# Patient Record
Sex: FEMALE | Race: OTHER | NOT HISPANIC OR LATINO | URBAN - METROPOLITAN AREA
[De-identification: names, ages, dates, MRNs, and addresses within clinical notes are randomized per-mention and may not be internally consistent; named-entity substitution may affect disease eponyms.]

---

## 2017-01-01 ENCOUNTER — OUTPATIENT (OUTPATIENT)
Dept: OUTPATIENT SERVICES | Facility: HOSPITAL | Age: 77
LOS: 1 days | Discharge: HOME | End: 2017-01-01

## 2017-01-01 DIAGNOSIS — R79.9 ABNORMAL FINDING OF BLOOD CHEMISTRY, UNSPECIFIED: ICD-10-CM

## 2017-01-01 DIAGNOSIS — D64.9 ANEMIA, UNSPECIFIED: ICD-10-CM

## 2018-01-01 ENCOUNTER — INPATIENT (INPATIENT)
Facility: HOSPITAL | Age: 78
LOS: 5 days | End: 2018-04-13
Attending: HOSPITALIST

## 2018-01-01 ENCOUNTER — OUTPATIENT (OUTPATIENT)
Dept: OUTPATIENT SERVICES | Facility: HOSPITAL | Age: 78
LOS: 1 days | Discharge: HOME | End: 2018-01-01

## 2018-01-01 VITALS
HEART RATE: 100 BPM | RESPIRATION RATE: 28 BRPM | SYSTOLIC BLOOD PRESSURE: 129 MMHG | DIASTOLIC BLOOD PRESSURE: 71 MMHG | OXYGEN SATURATION: 97 %

## 2018-01-01 VITALS — WEIGHT: 143.08 LBS | HEIGHT: 62 IN
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
## 2018-01-01 DIAGNOSIS — L03.90 CELLULITIS, UNSPECIFIED: ICD-10-CM

## 2018-01-01 DIAGNOSIS — E78.5 HYPERLIPIDEMIA, UNSPECIFIED: ICD-10-CM

## 2018-01-01 DIAGNOSIS — J18.9 PNEUMONIA, UNSPECIFIED ORGANISM: ICD-10-CM

## 2018-01-01 DIAGNOSIS — Z51.5 ENCOUNTER FOR PALLIATIVE CARE: ICD-10-CM

## 2018-01-01 DIAGNOSIS — I10 ESSENTIAL (PRIMARY) HYPERTENSION: ICD-10-CM

## 2018-01-01 DIAGNOSIS — E11.9 TYPE 2 DIABETES MELLITUS WITHOUT COMPLICATIONS: ICD-10-CM

## 2018-01-01 DIAGNOSIS — J40 BRONCHITIS, NOT SPECIFIED AS ACUTE OR CHRONIC: ICD-10-CM

## 2018-01-01 DIAGNOSIS — Z71.89 OTHER SPECIFIED COUNSELING: ICD-10-CM

## 2018-01-01 DIAGNOSIS — R55 SYNCOPE AND COLLAPSE: ICD-10-CM

## 2018-01-01 DIAGNOSIS — J45.909 UNSPECIFIED ASTHMA, UNCOMPLICATED: ICD-10-CM

## 2018-01-01 DIAGNOSIS — R53.1 WEAKNESS: ICD-10-CM

## 2018-01-01 DIAGNOSIS — Z98.890 OTHER SPECIFIED POSTPROCEDURAL STATES: Chronic | ICD-10-CM

## 2018-01-01 DIAGNOSIS — F03.90 UNSPECIFIED DEMENTIA WITHOUT BEHAVIORAL DISTURBANCE: ICD-10-CM

## 2018-01-01 DIAGNOSIS — S32.9XXA FRACTURE OF UNSPECIFIED PARTS OF LUMBOSACRAL SPINE AND PELVIS, INITIAL ENCOUNTER FOR CLOSED FRACTURE: ICD-10-CM

## 2018-01-01 DIAGNOSIS — R79.9 ABNORMAL FINDING OF BLOOD CHEMISTRY, UNSPECIFIED: ICD-10-CM

## 2018-01-01 DIAGNOSIS — I50.9 HEART FAILURE, UNSPECIFIED: ICD-10-CM

## 2018-01-01 DIAGNOSIS — F03.91 UNSPECIFIED DEMENTIA WITH BEHAVIORAL DISTURBANCE: ICD-10-CM

## 2018-01-01 DIAGNOSIS — D72.829 ELEVATED WHITE BLOOD CELL COUNT, UNSPECIFIED: ICD-10-CM

## 2018-01-01 DIAGNOSIS — J18.1 LOBAR PNEUMONIA, UNSPECIFIED ORGANISM: ICD-10-CM

## 2018-01-01 LAB
ALBUMIN SERPL ELPH-MCNC: 2.9 G/DL — LOW (ref 3.5–5.2)
ALBUMIN SERPL ELPH-MCNC: 3.9 G/DL — SIGNIFICANT CHANGE UP (ref 3.5–5.2)
ALP SERPL-CCNC: 117 U/L — HIGH (ref 30–115)
ALP SERPL-CCNC: 88 U/L — SIGNIFICANT CHANGE UP (ref 30–115)
ALT FLD-CCNC: 10 U/L — SIGNIFICANT CHANGE UP (ref 0–41)
ALT FLD-CCNC: 13 U/L — SIGNIFICANT CHANGE UP (ref 0–41)
ANION GAP SERPL CALC-SCNC: 11 MMOL/L — SIGNIFICANT CHANGE UP (ref 7–14)
ANION GAP SERPL CALC-SCNC: 13 MMOL/L — SIGNIFICANT CHANGE UP (ref 7–14)
ANION GAP SERPL CALC-SCNC: 16 MMOL/L — HIGH (ref 7–14)
ANION GAP SERPL CALC-SCNC: 17 MMOL/L — HIGH (ref 7–14)
ANION GAP SERPL CALC-SCNC: 17 MMOL/L — HIGH (ref 7–14)
ANION GAP SERPL CALC-SCNC: 18 MMOL/L — HIGH (ref 7–14)
ANION GAP SERPL CALC-SCNC: 20 MMOL/L — HIGH (ref 7–14)
APPEARANCE UR: (no result)
AST SERPL-CCNC: 12 U/L — SIGNIFICANT CHANGE UP (ref 0–41)
AST SERPL-CCNC: 13 U/L — SIGNIFICANT CHANGE UP (ref 0–41)
BACTERIA # UR AUTO: (no result) /HPF
BASE EXCESS BLDV CALC-SCNC: 0.2 MMOL/L — SIGNIFICANT CHANGE UP (ref -2–2)
BASOPHILS # BLD AUTO: 0.02 K/UL — SIGNIFICANT CHANGE UP (ref 0–0.2)
BASOPHILS # BLD AUTO: 0.04 K/UL — SIGNIFICANT CHANGE UP (ref 0–0.2)
BASOPHILS # BLD AUTO: 0.07 K/UL — SIGNIFICANT CHANGE UP (ref 0–0.2)
BASOPHILS # BLD AUTO: 0.24 K/UL — HIGH (ref 0–0.2)
BASOPHILS NFR BLD AUTO: 0.1 % — SIGNIFICANT CHANGE UP (ref 0–1)
BASOPHILS NFR BLD AUTO: 0.2 % — SIGNIFICANT CHANGE UP (ref 0–1)
BASOPHILS NFR BLD AUTO: 0.2 % — SIGNIFICANT CHANGE UP (ref 0–1)
BASOPHILS NFR BLD AUTO: 0.9 % — SIGNIFICANT CHANGE UP (ref 0–1)
BILIRUB SERPL-MCNC: 0.5 MG/DL — SIGNIFICANT CHANGE UP (ref 0.2–1.2)
BILIRUB SERPL-MCNC: 0.8 MG/DL — SIGNIFICANT CHANGE UP (ref 0.2–1.2)
BILIRUB UR-MCNC: (no result)
BUN SERPL-MCNC: 59 MG/DL — HIGH (ref 10–20)
BUN SERPL-MCNC: 72 MG/DL — CRITICAL HIGH (ref 10–20)
BUN SERPL-MCNC: 73 MG/DL — CRITICAL HIGH (ref 10–20)
BUN SERPL-MCNC: 74 MG/DL — CRITICAL HIGH (ref 10–20)
BUN SERPL-MCNC: 79 MG/DL — CRITICAL HIGH (ref 10–20)
BUN SERPL-MCNC: 86 MG/DL — CRITICAL HIGH (ref 10–20)
BUN SERPL-MCNC: 89 MG/DL — CRITICAL HIGH (ref 10–20)
CALCIUM SERPL-MCNC: 8.6 MG/DL — SIGNIFICANT CHANGE UP (ref 8.5–10.1)
CALCIUM SERPL-MCNC: 8.8 MG/DL — SIGNIFICANT CHANGE UP (ref 8.5–10.1)
CALCIUM SERPL-MCNC: 8.8 MG/DL — SIGNIFICANT CHANGE UP (ref 8.5–10.1)
CALCIUM SERPL-MCNC: 8.9 MG/DL — SIGNIFICANT CHANGE UP (ref 8.5–10.1)
CALCIUM SERPL-MCNC: 9 MG/DL — SIGNIFICANT CHANGE UP (ref 8.5–10.1)
CALCIUM SERPL-MCNC: 9.2 MG/DL — SIGNIFICANT CHANGE UP (ref 8.5–10.1)
CALCIUM SERPL-MCNC: 9.6 MG/DL — SIGNIFICANT CHANGE UP (ref 8.5–10.1)
CHLORIDE SERPL-SCNC: 103 MMOL/L — SIGNIFICANT CHANGE UP (ref 98–110)
CHLORIDE SERPL-SCNC: 105 MMOL/L — SIGNIFICANT CHANGE UP (ref 98–110)
CHLORIDE SERPL-SCNC: 106 MMOL/L — SIGNIFICANT CHANGE UP (ref 98–110)
CHLORIDE SERPL-SCNC: 113 MMOL/L — HIGH (ref 98–110)
CHLORIDE SERPL-SCNC: 117 MMOL/L — HIGH (ref 98–110)
CK SERPL-CCNC: 128 U/L — SIGNIFICANT CHANGE UP (ref 0–225)
CO2 SERPL-SCNC: 26 MMOL/L — SIGNIFICANT CHANGE UP (ref 17–32)
CO2 SERPL-SCNC: 27 MMOL/L — SIGNIFICANT CHANGE UP (ref 17–32)
CO2 SERPL-SCNC: 27 MMOL/L — SIGNIFICANT CHANGE UP (ref 17–32)
CO2 SERPL-SCNC: 28 MMOL/L — SIGNIFICANT CHANGE UP (ref 17–32)
CO2 SERPL-SCNC: 30 MMOL/L — SIGNIFICANT CHANGE UP (ref 17–32)
CO2 SERPL-SCNC: 30 MMOL/L — SIGNIFICANT CHANGE UP (ref 17–32)
CO2 SERPL-SCNC: 32 MMOL/L — SIGNIFICANT CHANGE UP (ref 17–32)
COLOR SPEC: (no result)
CREAT SERPL-MCNC: 1.4 MG/DL — SIGNIFICANT CHANGE UP (ref 0.7–1.5)
CREAT SERPL-MCNC: 1.5 MG/DL — SIGNIFICANT CHANGE UP (ref 0.7–1.5)
CREAT SERPL-MCNC: 1.7 MG/DL — HIGH (ref 0.7–1.5)
CREAT SERPL-MCNC: 1.9 MG/DL — HIGH (ref 0.7–1.5)
CREAT SERPL-MCNC: 2.1 MG/DL — HIGH (ref 0.7–1.5)
CULTURE RESULTS: NO GROWTH — SIGNIFICANT CHANGE UP
CULTURE RESULTS: SIGNIFICANT CHANGE UP
DIFF PNL FLD: (no result)
EOSINOPHIL # BLD AUTO: 0 K/UL — SIGNIFICANT CHANGE UP (ref 0–0.7)
EOSINOPHIL NFR BLD AUTO: 0 % — SIGNIFICANT CHANGE UP (ref 0–8)
EPI CELLS # UR: (no result) /HPF
FLU A RESULT: NEGATIVE — SIGNIFICANT CHANGE UP
FLU A RESULT: NEGATIVE — SIGNIFICANT CHANGE UP
FLUAV AG NPH QL: NEGATIVE — SIGNIFICANT CHANGE UP
FLUBV AG NPH QL: NEGATIVE — SIGNIFICANT CHANGE UP
GAS PNL BLDA: SIGNIFICANT CHANGE UP
GIANT PLATELETS BLD QL SMEAR: PRESENT — SIGNIFICANT CHANGE UP
GLUCOSE SERPL-MCNC: 173 MG/DL — HIGH (ref 70–99)
GLUCOSE SERPL-MCNC: 199 MG/DL — HIGH (ref 70–99)
GLUCOSE SERPL-MCNC: 206 MG/DL — HIGH (ref 70–99)
GLUCOSE SERPL-MCNC: 258 MG/DL — HIGH (ref 70–99)
GLUCOSE SERPL-MCNC: 270 MG/DL — HIGH (ref 70–99)
GLUCOSE SERPL-MCNC: 278 MG/DL — HIGH (ref 70–99)
GLUCOSE SERPL-MCNC: 376 MG/DL — HIGH (ref 70–99)
GLUCOSE UR QL: NEGATIVE MG/DL — SIGNIFICANT CHANGE UP
GRAN CASTS # UR COMP ASSIST: (no result) /LPF
HCO3 BLDV-SCNC: 28 MMOL/L — SIGNIFICANT CHANGE UP (ref 22–29)
HCT VFR BLD CALC: 35.2 % — LOW (ref 37–47)
HCT VFR BLD CALC: 35.7 % — LOW (ref 37–47)
HCT VFR BLD CALC: 36.2 % — LOW (ref 37–47)
HCT VFR BLD CALC: 40.4 % — SIGNIFICANT CHANGE UP (ref 37–47)
HCT VFR BLD CALC: 42.1 % — SIGNIFICANT CHANGE UP (ref 37–47)
HGB BLD-MCNC: 10.6 G/DL — LOW (ref 12–16)
HGB BLD-MCNC: 10.6 G/DL — LOW (ref 12–16)
HGB BLD-MCNC: 10.8 G/DL — LOW (ref 12–16)
HGB BLD-MCNC: 11.5 G/DL — LOW (ref 12–16)
HGB BLD-MCNC: 12.6 G/DL — SIGNIFICANT CHANGE UP (ref 12–16)
IMM GRANULOCYTES NFR BLD AUTO: 1.2 % — HIGH (ref 0.1–0.3)
IMM GRANULOCYTES NFR BLD AUTO: 1.9 % — HIGH (ref 0.1–0.3)
IMM GRANULOCYTES NFR BLD AUTO: 3.2 % — HIGH (ref 0.1–0.3)
KETONES UR-MCNC: NEGATIVE — SIGNIFICANT CHANGE UP
LACTATE BLDV-MCNC: 1.5 MMOL/L — SIGNIFICANT CHANGE UP (ref 0.5–1.6)
LACTATE SERPL-SCNC: 1 MMOL/L — SIGNIFICANT CHANGE UP (ref 0.5–2.2)
LEUKOCYTE ESTERASE UR-ACNC: (no result)
LYMPHOCYTES # BLD AUTO: 1.85 K/UL — SIGNIFICANT CHANGE UP (ref 1.2–3.4)
LYMPHOCYTES # BLD AUTO: 10.4 % — LOW (ref 20.5–51.1)
LYMPHOCYTES # BLD AUTO: 10.8 % — LOW (ref 20.5–51.1)
LYMPHOCYTES # BLD AUTO: 13 % — LOW (ref 20.5–51.1)
LYMPHOCYTES # BLD AUTO: 2.34 K/UL — SIGNIFICANT CHANGE UP (ref 1.2–3.4)
LYMPHOCYTES # BLD AUTO: 2.62 K/UL — SIGNIFICANT CHANGE UP (ref 1.2–3.4)
LYMPHOCYTES # BLD AUTO: 3.46 K/UL — HIGH (ref 1.2–3.4)
LYMPHOCYTES # BLD AUTO: 9.3 % — LOW (ref 20.5–51.1)
MAGNESIUM SERPL-MCNC: 2.3 MG/DL — SIGNIFICANT CHANGE UP (ref 1.8–2.4)
MAGNESIUM SERPL-MCNC: 2.5 MG/DL — HIGH (ref 1.8–2.4)
MAGNESIUM SERPL-MCNC: 2.6 MG/DL — HIGH (ref 1.8–2.4)
MANUAL SMEAR VERIFICATION: SIGNIFICANT CHANGE UP
MCHC RBC-ENTMCNC: 27.7 PG — SIGNIFICANT CHANGE UP (ref 27–31)
MCHC RBC-ENTMCNC: 27.8 PG — SIGNIFICANT CHANGE UP (ref 27–31)
MCHC RBC-ENTMCNC: 27.9 PG — SIGNIFICANT CHANGE UP (ref 27–31)
MCHC RBC-ENTMCNC: 28 PG — SIGNIFICANT CHANGE UP (ref 27–31)
MCHC RBC-ENTMCNC: 28.1 PG — SIGNIFICANT CHANGE UP (ref 27–31)
MCHC RBC-ENTMCNC: 28.5 G/DL — LOW (ref 32–37)
MCHC RBC-ENTMCNC: 29.7 G/DL — LOW (ref 32–37)
MCHC RBC-ENTMCNC: 29.8 G/DL — LOW (ref 32–37)
MCHC RBC-ENTMCNC: 29.9 G/DL — LOW (ref 32–37)
MCHC RBC-ENTMCNC: 30.1 G/DL — LOW (ref 32–37)
MCV RBC AUTO: 92.9 FL — SIGNIFICANT CHANGE UP (ref 81–99)
MCV RBC AUTO: 93.1 FL — SIGNIFICANT CHANGE UP (ref 81–99)
MCV RBC AUTO: 93.3 FL — SIGNIFICANT CHANGE UP (ref 81–99)
MCV RBC AUTO: 94.7 FL — SIGNIFICANT CHANGE UP (ref 81–99)
MCV RBC AUTO: 97.3 FL — SIGNIFICANT CHANGE UP (ref 81–99)
MONOCYTES # BLD AUTO: 0.31 K/UL — SIGNIFICANT CHANGE UP (ref 0.1–0.6)
MONOCYTES # BLD AUTO: 0.76 K/UL — HIGH (ref 0.1–0.6)
MONOCYTES # BLD AUTO: 0.93 K/UL — HIGH (ref 0.1–0.6)
MONOCYTES # BLD AUTO: 1.45 K/UL — HIGH (ref 0.1–0.6)
MONOCYTES NFR BLD AUTO: 1.7 % — SIGNIFICANT CHANGE UP (ref 1.7–9.3)
MONOCYTES NFR BLD AUTO: 2.7 % — SIGNIFICANT CHANGE UP (ref 1.7–9.3)
MONOCYTES NFR BLD AUTO: 3.5 % — SIGNIFICANT CHANGE UP (ref 1.7–9.3)
MONOCYTES NFR BLD AUTO: 6.7 % — SIGNIFICANT CHANGE UP (ref 1.7–9.3)
MYELOCYTES NFR BLD: 0.9 % — HIGH (ref 0–0)
NEUTROPHILS # BLD AUTO: 15.34 K/UL — HIGH (ref 1.4–6.5)
NEUTROPHILS # BLD AUTO: 17.17 K/UL — HIGH (ref 1.4–6.5)
NEUTROPHILS # BLD AUTO: 21.29 K/UL — HIGH (ref 1.4–6.5)
NEUTROPHILS # BLD AUTO: 24.25 K/UL — HIGH (ref 1.4–6.5)
NEUTROPHILS NFR BLD AUTO: 79.1 % — HIGH (ref 42.2–75.2)
NEUTROPHILS NFR BLD AUTO: 79.1 % — HIGH (ref 42.2–75.2)
NEUTROPHILS NFR BLD AUTO: 85.9 % — HIGH (ref 42.2–75.2)
NEUTROPHILS NFR BLD AUTO: 86.6 % — HIGH (ref 42.2–75.2)
NEUTS BAND # BLD: 0.9 % — SIGNIFICANT CHANGE UP (ref 0–6)
NITRITE UR-MCNC: NEGATIVE — SIGNIFICANT CHANGE UP
NRBC # BLD: 0 /100 WBCS — SIGNIFICANT CHANGE UP (ref 0–0)
NT-PROBNP SERPL-SCNC: 1530 PG/ML — HIGH (ref 0–300)
PCO2 BLDV: 58 MMHG — HIGH (ref 41–51)
PH BLDV: 7.29 — SIGNIFICANT CHANGE UP (ref 7.26–7.43)
PH UR: 5 — SIGNIFICANT CHANGE UP (ref 5–8)
PLAT MORPH BLD: NORMAL — SIGNIFICANT CHANGE UP
PLATELET # BLD AUTO: 322 K/UL — SIGNIFICANT CHANGE UP (ref 130–400)
PLATELET # BLD AUTO: 325 K/UL — SIGNIFICANT CHANGE UP (ref 130–400)
PLATELET # BLD AUTO: 365 K/UL — SIGNIFICANT CHANGE UP (ref 130–400)
PLATELET # BLD AUTO: 394 K/UL — SIGNIFICANT CHANGE UP (ref 130–400)
PLATELET # BLD AUTO: 416 K/UL — HIGH (ref 130–400)
PO2 BLDV: 74 MMHG — HIGH (ref 20–40)
POLYCHROMASIA BLD QL SMEAR: SLIGHT — SIGNIFICANT CHANGE UP
POTASSIUM SERPL-MCNC: 4.2 MMOL/L — SIGNIFICANT CHANGE UP (ref 3.5–5)
POTASSIUM SERPL-MCNC: 4.2 MMOL/L — SIGNIFICANT CHANGE UP (ref 3.5–5)
POTASSIUM SERPL-MCNC: 4.4 MMOL/L — SIGNIFICANT CHANGE UP (ref 3.5–5)
POTASSIUM SERPL-MCNC: 4.4 MMOL/L — SIGNIFICANT CHANGE UP (ref 3.5–5)
POTASSIUM SERPL-MCNC: 4.6 MMOL/L — SIGNIFICANT CHANGE UP (ref 3.5–5)
POTASSIUM SERPL-MCNC: 4.9 MMOL/L — SIGNIFICANT CHANGE UP (ref 3.5–5)
POTASSIUM SERPL-MCNC: 5.1 MMOL/L — HIGH (ref 3.5–5)
POTASSIUM SERPL-SCNC: 4.2 MMOL/L — SIGNIFICANT CHANGE UP (ref 3.5–5)
POTASSIUM SERPL-SCNC: 4.2 MMOL/L — SIGNIFICANT CHANGE UP (ref 3.5–5)
POTASSIUM SERPL-SCNC: 4.4 MMOL/L — SIGNIFICANT CHANGE UP (ref 3.5–5)
POTASSIUM SERPL-SCNC: 4.4 MMOL/L — SIGNIFICANT CHANGE UP (ref 3.5–5)
POTASSIUM SERPL-SCNC: 4.6 MMOL/L — SIGNIFICANT CHANGE UP (ref 3.5–5)
POTASSIUM SERPL-SCNC: 4.9 MMOL/L — SIGNIFICANT CHANGE UP (ref 3.5–5)
POTASSIUM SERPL-SCNC: 5.1 MMOL/L — HIGH (ref 3.5–5)
PROT SERPL-MCNC: 6.7 G/DL — SIGNIFICANT CHANGE UP (ref 6–8)
PROT SERPL-MCNC: 7.8 G/DL — SIGNIFICANT CHANGE UP (ref 6–8)
PROT UR-MCNC: 30 MG/DL
RAPID RVP RESULT: SIGNIFICANT CHANGE UP
RBC # BLD: 3.77 M/UL — LOW (ref 4.2–5.4)
RBC # BLD: 3.79 M/UL — LOW (ref 4.2–5.4)
RBC # BLD: 3.89 M/UL — LOW (ref 4.2–5.4)
RBC # BLD: 4.15 M/UL — LOW (ref 4.2–5.4)
RBC # BLD: 4.51 M/UL — SIGNIFICANT CHANGE UP (ref 4.2–5.4)
RBC # FLD: 15.6 % — HIGH (ref 11.5–14.5)
RBC # FLD: 15.6 % — HIGH (ref 11.5–14.5)
RBC # FLD: 15.8 % — HIGH (ref 11.5–14.5)
RBC # FLD: 16 % — HIGH (ref 11.5–14.5)
RBC # FLD: 16.1 % — HIGH (ref 11.5–14.5)
RBC BLD AUTO: NORMAL — SIGNIFICANT CHANGE UP
RBC CASTS # UR COMP ASSIST: (no result) /HPF
SAO2 % BLDV: 94 % — SIGNIFICANT CHANGE UP
SODIUM SERPL-SCNC: 146 MMOL/L — SIGNIFICANT CHANGE UP (ref 135–146)
SODIUM SERPL-SCNC: 149 MMOL/L — HIGH (ref 135–146)
SODIUM SERPL-SCNC: 151 MMOL/L — HIGH (ref 135–146)
SODIUM SERPL-SCNC: 152 MMOL/L — HIGH (ref 135–146)
SODIUM SERPL-SCNC: 154 MMOL/L — HIGH (ref 135–146)
SODIUM SERPL-SCNC: 156 MMOL/L — HIGH (ref 135–146)
SODIUM SERPL-SCNC: 160 MMOL/L — HIGH (ref 135–146)
SP GR SPEC: 1.02 — SIGNIFICANT CHANGE UP (ref 1.01–1.03)
SPECIMEN SOURCE: SIGNIFICANT CHANGE UP
SPECIMEN SOURCE: SIGNIFICANT CHANGE UP
TROPONIN T SERPL-MCNC: 0.01 NG/ML — SIGNIFICANT CHANGE UP
UROBILINOGEN FLD QL: 1 MG/DL (ref 0.2–0.2)
VARIANT LYMPHS # BLD: 1.7 % — SIGNIFICANT CHANGE UP (ref 0–5)
WBC # BLD: 17.86 K/UL — HIGH (ref 4.8–10.8)
WBC # BLD: 20.13 K/UL — HIGH (ref 4.8–10.8)
WBC # BLD: 21.69 K/UL — HIGH (ref 4.8–10.8)
WBC # BLD: 26.61 K/UL — HIGH (ref 4.8–10.8)
WBC # BLD: 28.03 K/UL — HIGH (ref 4.8–10.8)
WBC # FLD AUTO: 17.86 K/UL — HIGH (ref 4.8–10.8)
WBC # FLD AUTO: 20.13 K/UL — HIGH (ref 4.8–10.8)
WBC # FLD AUTO: 21.69 K/UL — HIGH (ref 4.8–10.8)
WBC # FLD AUTO: 26.61 K/UL — HIGH (ref 4.8–10.8)
WBC # FLD AUTO: 28.03 K/UL — HIGH (ref 4.8–10.8)
WBC UR QL: (no result) /HPF

## 2018-01-01 RX ORDER — IPRATROPIUM/ALBUTEROL SULFATE 18-103MCG
3 AEROSOL WITH ADAPTER (GRAM) INHALATION
Qty: 0 | Refills: 0 | Status: COMPLETED | OUTPATIENT
Start: 2018-01-01 | End: 2018-01-01

## 2018-01-01 RX ORDER — SODIUM CHLORIDE 9 MG/ML
1000 INJECTION, SOLUTION INTRAVENOUS
Qty: 0 | Refills: 0 | Status: DISCONTINUED | OUTPATIENT
Start: 2018-01-01 | End: 2018-01-01

## 2018-01-01 RX ORDER — DEXTROSE MONOHYDRATE, SODIUM CHLORIDE, AND POTASSIUM CHLORIDE 50; .745; 4.5 G/1000ML; G/1000ML; G/1000ML
1000 INJECTION, SOLUTION INTRAVENOUS
Qty: 0 | Refills: 0 | Status: DISCONTINUED | OUTPATIENT
Start: 2018-01-01 | End: 2018-01-01

## 2018-01-01 RX ORDER — ROBINUL 0.2 MG/ML
2 INJECTION INTRAMUSCULAR; INTRAVENOUS EVERY 6 HOURS
Qty: 0 | Refills: 0 | Status: DISCONTINUED | OUTPATIENT
Start: 2018-01-01 | End: 2018-01-01

## 2018-01-01 RX ORDER — IPRATROPIUM/ALBUTEROL SULFATE 18-103MCG
0 AEROSOL WITH ADAPTER (GRAM) INHALATION
Qty: 180 | Refills: 0 | COMMUNITY

## 2018-01-01 RX ORDER — LORATADINE 10 MG/1
1 TABLET ORAL
Qty: 0 | Refills: 0 | COMMUNITY

## 2018-01-01 RX ORDER — ACETAMINOPHEN 500 MG
650 TABLET ORAL ONCE
Qty: 0 | Refills: 0 | Status: COMPLETED | OUTPATIENT
Start: 2018-01-01 | End: 2018-01-01

## 2018-01-01 RX ORDER — MORPHINE SULFATE 50 MG/1
2 CAPSULE, EXTENDED RELEASE ORAL EVERY 4 HOURS
Qty: 0 | Refills: 0 | Status: DISCONTINUED | OUTPATIENT
Start: 2018-01-01 | End: 2018-01-01

## 2018-01-01 RX ORDER — FUROSEMIDE 40 MG
40 TABLET ORAL ONCE
Qty: 0 | Refills: 0 | Status: COMPLETED | OUTPATIENT
Start: 2018-01-01 | End: 2018-01-01

## 2018-01-01 RX ORDER — DONEPEZIL HYDROCHLORIDE 10 MG/1
0 TABLET, FILM COATED ORAL
Qty: 30 | Refills: 0 | COMMUNITY

## 2018-01-01 RX ORDER — MIRTAZAPINE 45 MG/1
30 TABLET, ORALLY DISINTEGRATING ORAL AT BEDTIME
Qty: 0 | Refills: 0 | Status: DISCONTINUED | OUTPATIENT
Start: 2018-01-01 | End: 2018-01-01

## 2018-01-01 RX ORDER — SODIUM CHLORIDE 9 MG/ML
1000 INJECTION INTRAMUSCULAR; INTRAVENOUS; SUBCUTANEOUS
Qty: 0 | Refills: 0 | Status: DISCONTINUED | OUTPATIENT
Start: 2018-01-01 | End: 2018-01-01

## 2018-01-01 RX ORDER — LORATADINE 10 MG/1
10 TABLET ORAL DAILY
Qty: 0 | Refills: 0 | Status: DISCONTINUED | OUTPATIENT
Start: 2018-01-01 | End: 2018-01-01

## 2018-01-01 RX ORDER — MEMANTINE HYDROCHLORIDE 10 MG/1
0 TABLET ORAL
Qty: 30 | Refills: 0 | COMMUNITY

## 2018-01-01 RX ORDER — SODIUM CHLORIDE 9 MG/ML
1000 INJECTION, SOLUTION INTRAVENOUS ONCE
Qty: 0 | Refills: 0 | Status: COMPLETED | OUTPATIENT
Start: 2018-01-01 | End: 2018-01-01

## 2018-01-01 RX ORDER — OMEPRAZOLE 10 MG/1
0 CAPSULE, DELAYED RELEASE ORAL
Qty: 30 | Refills: 0 | COMMUNITY

## 2018-01-01 RX ORDER — BACITRACIN ZINC NEOMYCIN SULFATE POLYMYXIN B SULFATE 400; 3.5; 5 [IU]/G; MG/G; [IU]/G
1 OINTMENT TOPICAL
Qty: 0 | Refills: 0 | Status: DISCONTINUED | OUTPATIENT
Start: 2018-01-01 | End: 2018-01-01

## 2018-01-01 RX ORDER — IPRATROPIUM/ALBUTEROL SULFATE 18-103MCG
3 AEROSOL WITH ADAPTER (GRAM) INHALATION ONCE
Qty: 0 | Refills: 0 | Status: COMPLETED | OUTPATIENT
Start: 2018-01-01 | End: 2018-01-01

## 2018-01-01 RX ORDER — DOCUSATE SODIUM 100 MG
1 CAPSULE ORAL
Qty: 0 | Refills: 0 | COMMUNITY

## 2018-01-01 RX ORDER — ALENDRONATE SODIUM 70 MG/1
1 TABLET ORAL
Qty: 0 | Refills: 0 | COMMUNITY

## 2018-01-01 RX ORDER — MORPHINE SULFATE 50 MG/1
1 CAPSULE, EXTENDED RELEASE ORAL
Qty: 250 | Refills: 0 | Status: DISCONTINUED | OUTPATIENT
Start: 2018-01-01 | End: 2018-01-01

## 2018-01-01 RX ORDER — MEMANTINE HYDROCHLORIDE 10 MG/1
5 TABLET ORAL DAILY
Qty: 0 | Refills: 0 | Status: DISCONTINUED | OUTPATIENT
Start: 2018-01-01 | End: 2018-01-01

## 2018-01-01 RX ORDER — ROBINUL 0.2 MG/ML
0.4 INJECTION INTRAMUSCULAR; INTRAVENOUS ONCE
Qty: 0 | Refills: 0 | Status: DISCONTINUED | OUTPATIENT
Start: 2018-01-01 | End: 2018-01-01

## 2018-01-01 RX ORDER — PIPERACILLIN AND TAZOBACTAM 4; .5 G/20ML; G/20ML
3.38 INJECTION, POWDER, LYOPHILIZED, FOR SOLUTION INTRAVENOUS EVERY 8 HOURS
Qty: 0 | Refills: 0 | Status: DISCONTINUED | OUTPATIENT
Start: 2018-01-01 | End: 2018-01-01

## 2018-01-01 RX ORDER — PANTOPRAZOLE SODIUM 20 MG/1
40 TABLET, DELAYED RELEASE ORAL
Qty: 0 | Refills: 0 | Status: DISCONTINUED | OUTPATIENT
Start: 2018-01-01 | End: 2018-01-01

## 2018-01-01 RX ORDER — ACETAMINOPHEN 500 MG
1 TABLET ORAL
Qty: 0 | Refills: 0 | COMMUNITY

## 2018-01-01 RX ORDER — AZITHROMYCIN 500 MG/1
500 TABLET, FILM COATED ORAL EVERY 24 HOURS
Qty: 0 | Refills: 0 | Status: DISCONTINUED | OUTPATIENT
Start: 2018-01-01 | End: 2018-01-01

## 2018-01-01 RX ORDER — CEFEPIME 1 G/1
1000 INJECTION, POWDER, FOR SOLUTION INTRAMUSCULAR; INTRAVENOUS EVERY 12 HOURS
Qty: 0 | Refills: 0 | Status: DISCONTINUED | OUTPATIENT
Start: 2018-01-01 | End: 2018-01-01

## 2018-01-01 RX ORDER — MIRTAZAPINE 45 MG/1
0 TABLET, ORALLY DISINTEGRATING ORAL
Qty: 30 | Refills: 0 | COMMUNITY

## 2018-01-01 RX ORDER — MORPHINE SULFATE 50 MG/1
4 CAPSULE, EXTENDED RELEASE ORAL ONCE
Qty: 0 | Refills: 0 | Status: DISCONTINUED | OUTPATIENT
Start: 2018-01-01 | End: 2018-01-01

## 2018-01-01 RX ORDER — DOCUSATE SODIUM 100 MG
100 CAPSULE ORAL
Qty: 0 | Refills: 0 | Status: DISCONTINUED | OUTPATIENT
Start: 2018-01-01 | End: 2018-01-01

## 2018-01-01 RX ORDER — BACITRACIN ZINC NEOMYCIN SULFATE POLYMYXIN B SULFATE 400; 3.5; 5 [IU]/G; MG/G; [IU]/G
1 OINTMENT TOPICAL
Qty: 0 | Refills: 0 | COMMUNITY

## 2018-01-01 RX ORDER — ASPIRIN/CALCIUM CARB/MAGNESIUM 324 MG
1 TABLET ORAL
Qty: 0 | Refills: 0 | COMMUNITY

## 2018-01-01 RX ORDER — METOPROLOL TARTRATE 50 MG
25 TABLET ORAL
Qty: 0 | Refills: 0 | Status: DISCONTINUED | OUTPATIENT
Start: 2018-01-01 | End: 2018-01-01

## 2018-01-01 RX ORDER — METOPROLOL TARTRATE 50 MG
0 TABLET ORAL
Qty: 60 | Refills: 0 | COMMUNITY

## 2018-01-01 RX ORDER — DONEPEZIL HYDROCHLORIDE 10 MG/1
10 TABLET, FILM COATED ORAL AT BEDTIME
Qty: 0 | Refills: 0 | Status: DISCONTINUED | OUTPATIENT
Start: 2018-01-01 | End: 2018-01-01

## 2018-01-01 RX ORDER — HEPARIN SODIUM 5000 [USP'U]/ML
5000 INJECTION INTRAVENOUS; SUBCUTANEOUS EVERY 8 HOURS
Qty: 0 | Refills: 0 | Status: DISCONTINUED | OUTPATIENT
Start: 2018-01-01 | End: 2018-01-01

## 2018-01-01 RX ORDER — ACETAMINOPHEN 500 MG
650 TABLET ORAL EVERY 6 HOURS
Qty: 0 | Refills: 0 | Status: DISCONTINUED | OUTPATIENT
Start: 2018-01-01 | End: 2018-01-01

## 2018-01-01 RX ORDER — OXYCODONE AND ACETAMINOPHEN 5; 325 MG/1; MG/1
1 TABLET ORAL AT BEDTIME
Qty: 0 | Refills: 0 | Status: DISCONTINUED | OUTPATIENT
Start: 2018-01-01 | End: 2018-01-01

## 2018-01-01 RX ORDER — ASPIRIN/CALCIUM CARB/MAGNESIUM 324 MG
81 TABLET ORAL DAILY
Qty: 0 | Refills: 0 | Status: DISCONTINUED | OUTPATIENT
Start: 2018-01-01 | End: 2018-01-01

## 2018-01-01 RX ORDER — CEFTRIAXONE 500 MG/1
1 INJECTION, POWDER, FOR SOLUTION INTRAMUSCULAR; INTRAVENOUS EVERY 24 HOURS
Qty: 0 | Refills: 0 | Status: DISCONTINUED | OUTPATIENT
Start: 2018-01-01 | End: 2018-01-01

## 2018-01-01 RX ORDER — ROBINUL 0.2 MG/ML
0.2 INJECTION INTRAMUSCULAR; INTRAVENOUS EVERY 6 HOURS
Qty: 0 | Refills: 0 | Status: DISCONTINUED | OUTPATIENT
Start: 2018-01-01 | End: 2018-01-01

## 2018-01-01 RX ADMIN — Medication 650 MILLIGRAM(S): at 04:44

## 2018-01-01 RX ADMIN — HEPARIN SODIUM 5000 UNIT(S): 5000 INJECTION INTRAVENOUS; SUBCUTANEOUS at 17:37

## 2018-01-01 RX ADMIN — BACITRACIN ZINC NEOMYCIN SULFATE POLYMYXIN B SULFATE 1 APPLICATION(S): 400; 3.5; 5 OINTMENT TOPICAL at 06:05

## 2018-01-01 RX ADMIN — HEPARIN SODIUM 5000 UNIT(S): 5000 INJECTION INTRAVENOUS; SUBCUTANEOUS at 06:01

## 2018-01-01 RX ADMIN — Medication 81 MILLIGRAM(S): at 12:14

## 2018-01-01 RX ADMIN — Medication 650 MILLIGRAM(S): at 05:33

## 2018-01-01 RX ADMIN — HEPARIN SODIUM 5000 UNIT(S): 5000 INJECTION INTRAVENOUS; SUBCUTANEOUS at 18:31

## 2018-01-01 RX ADMIN — HEPARIN SODIUM 5000 UNIT(S): 5000 INJECTION INTRAVENOUS; SUBCUTANEOUS at 05:12

## 2018-01-01 RX ADMIN — Medication 650 MILLIGRAM(S): at 18:29

## 2018-01-01 RX ADMIN — OXYCODONE AND ACETAMINOPHEN 1 TABLET(S): 5; 325 TABLET ORAL at 21:47

## 2018-01-01 RX ADMIN — Medication 25 MILLIGRAM(S): at 17:32

## 2018-01-01 RX ADMIN — BACITRACIN ZINC NEOMYCIN SULFATE POLYMYXIN B SULFATE 1 APPLICATION(S): 400; 3.5; 5 OINTMENT TOPICAL at 05:49

## 2018-01-01 RX ADMIN — BACITRACIN ZINC NEOMYCIN SULFATE POLYMYXIN B SULFATE 1 APPLICATION(S): 400; 3.5; 5 OINTMENT TOPICAL at 18:12

## 2018-01-01 RX ADMIN — Medication 650 MILLIGRAM(S): at 05:13

## 2018-01-01 RX ADMIN — MORPHINE SULFATE 2 MILLIGRAM(S): 50 CAPSULE, EXTENDED RELEASE ORAL at 12:39

## 2018-01-01 RX ADMIN — HEPARIN SODIUM 5000 UNIT(S): 5000 INJECTION INTRAVENOUS; SUBCUTANEOUS at 21:45

## 2018-01-01 RX ADMIN — Medication 100 MILLIGRAM(S): at 17:32

## 2018-01-01 RX ADMIN — SODIUM CHLORIDE 50 MILLILITER(S): 9 INJECTION, SOLUTION INTRAVENOUS at 21:00

## 2018-01-01 RX ADMIN — HEPARIN SODIUM 5000 UNIT(S): 5000 INJECTION INTRAVENOUS; SUBCUTANEOUS at 05:32

## 2018-01-01 RX ADMIN — Medication 3 MILLILITER(S): at 05:27

## 2018-01-01 RX ADMIN — CEFEPIME 100 MILLIGRAM(S): 1 INJECTION, POWDER, FOR SOLUTION INTRAMUSCULAR; INTRAVENOUS at 05:59

## 2018-01-01 RX ADMIN — Medication 30 MILLIGRAM(S): at 18:12

## 2018-01-01 RX ADMIN — PIPERACILLIN AND TAZOBACTAM 25 GRAM(S): 4; .5 INJECTION, POWDER, LYOPHILIZED, FOR SOLUTION INTRAVENOUS at 15:26

## 2018-01-01 RX ADMIN — AZITHROMYCIN 255 MILLIGRAM(S): 500 TABLET, FILM COATED ORAL at 06:08

## 2018-01-01 RX ADMIN — Medication 25 MILLIGRAM(S): at 18:12

## 2018-01-01 RX ADMIN — Medication 100 MILLIGRAM(S): at 19:22

## 2018-01-01 RX ADMIN — AZITHROMYCIN 255 MILLIGRAM(S): 500 TABLET, FILM COATED ORAL at 05:56

## 2018-01-01 RX ADMIN — HEPARIN SODIUM 5000 UNIT(S): 5000 INJECTION INTRAVENOUS; SUBCUTANEOUS at 17:42

## 2018-01-01 RX ADMIN — HEPARIN SODIUM 5000 UNIT(S): 5000 INJECTION INTRAVENOUS; SUBCUTANEOUS at 15:27

## 2018-01-01 RX ADMIN — AZITHROMYCIN 255 MILLIGRAM(S): 500 TABLET, FILM COATED ORAL at 05:32

## 2018-01-01 RX ADMIN — Medication 650 MILLIGRAM(S): at 01:00

## 2018-01-01 RX ADMIN — HEPARIN SODIUM 5000 UNIT(S): 5000 INJECTION INTRAVENOUS; SUBCUTANEOUS at 23:15

## 2018-01-01 RX ADMIN — CEFEPIME 100 MILLIGRAM(S): 1 INJECTION, POWDER, FOR SOLUTION INTRAMUSCULAR; INTRAVENOUS at 06:08

## 2018-01-01 RX ADMIN — MEMANTINE HYDROCHLORIDE 5 MILLIGRAM(S): 10 TABLET ORAL at 11:39

## 2018-01-01 RX ADMIN — SODIUM CHLORIDE 50 MILLILITER(S): 9 INJECTION, SOLUTION INTRAVENOUS at 11:43

## 2018-01-01 RX ADMIN — BACITRACIN ZINC NEOMYCIN SULFATE POLYMYXIN B SULFATE 1 APPLICATION(S): 400; 3.5; 5 OINTMENT TOPICAL at 17:32

## 2018-01-01 RX ADMIN — AZITHROMYCIN 255 MILLIGRAM(S): 500 TABLET, FILM COATED ORAL at 05:58

## 2018-01-01 RX ADMIN — PANTOPRAZOLE SODIUM 40 MILLIGRAM(S): 20 TABLET, DELAYED RELEASE ORAL at 07:18

## 2018-01-01 RX ADMIN — Medication 650 MILLIGRAM(S): at 11:37

## 2018-01-01 RX ADMIN — HEPARIN SODIUM 5000 UNIT(S): 5000 INJECTION INTRAVENOUS; SUBCUTANEOUS at 21:21

## 2018-01-01 RX ADMIN — Medication 650 MILLIGRAM(S): at 00:38

## 2018-01-01 RX ADMIN — PIPERACILLIN AND TAZOBACTAM 25 GRAM(S): 4; .5 INJECTION, POWDER, LYOPHILIZED, FOR SOLUTION INTRAVENOUS at 05:12

## 2018-01-01 RX ADMIN — HEPARIN SODIUM 5000 UNIT(S): 5000 INJECTION INTRAVENOUS; SUBCUTANEOUS at 05:33

## 2018-01-01 RX ADMIN — CEFEPIME 100 MILLIGRAM(S): 1 INJECTION, POWDER, FOR SOLUTION INTRAMUSCULAR; INTRAVENOUS at 17:31

## 2018-01-01 RX ADMIN — Medication 40 MILLIGRAM(S): at 04:44

## 2018-01-01 RX ADMIN — Medication 650 MILLIGRAM(S): at 21:29

## 2018-01-01 RX ADMIN — DEXTROSE MONOHYDRATE, SODIUM CHLORIDE, AND POTASSIUM CHLORIDE 75 MILLILITER(S): 50; .745; 4.5 INJECTION, SOLUTION INTRAVENOUS at 12:49

## 2018-01-01 RX ADMIN — CEFEPIME 100 MILLIGRAM(S): 1 INJECTION, POWDER, FOR SOLUTION INTRAMUSCULAR; INTRAVENOUS at 05:58

## 2018-01-01 RX ADMIN — BACITRACIN ZINC NEOMYCIN SULFATE POLYMYXIN B SULFATE 1 APPLICATION(S): 400; 3.5; 5 OINTMENT TOPICAL at 18:29

## 2018-01-01 RX ADMIN — HEPARIN SODIUM 5000 UNIT(S): 5000 INJECTION INTRAVENOUS; SUBCUTANEOUS at 14:38

## 2018-01-01 RX ADMIN — PIPERACILLIN AND TAZOBACTAM 25 GRAM(S): 4; .5 INJECTION, POWDER, LYOPHILIZED, FOR SOLUTION INTRAVENOUS at 21:33

## 2018-01-01 RX ADMIN — LORATADINE 10 MILLIGRAM(S): 10 TABLET ORAL at 12:14

## 2018-01-01 RX ADMIN — MEMANTINE HYDROCHLORIDE 5 MILLIGRAM(S): 10 TABLET ORAL at 12:15

## 2018-01-01 RX ADMIN — BACITRACIN ZINC NEOMYCIN SULFATE POLYMYXIN B SULFATE 1 APPLICATION(S): 400; 3.5; 5 OINTMENT TOPICAL at 19:06

## 2018-01-01 RX ADMIN — DONEPEZIL HYDROCHLORIDE 10 MILLIGRAM(S): 10 TABLET, FILM COATED ORAL at 23:27

## 2018-01-01 RX ADMIN — DEXTROSE MONOHYDRATE, SODIUM CHLORIDE, AND POTASSIUM CHLORIDE 75 MILLILITER(S): 50; .745; 4.5 INJECTION, SOLUTION INTRAVENOUS at 20:58

## 2018-01-01 RX ADMIN — HEPARIN SODIUM 5000 UNIT(S): 5000 INJECTION INTRAVENOUS; SUBCUTANEOUS at 21:01

## 2018-01-01 RX ADMIN — HEPARIN SODIUM 5000 UNIT(S): 5000 INJECTION INTRAVENOUS; SUBCUTANEOUS at 21:33

## 2018-01-01 RX ADMIN — BACITRACIN ZINC NEOMYCIN SULFATE POLYMYXIN B SULFATE 1 APPLICATION(S): 400; 3.5; 5 OINTMENT TOPICAL at 06:34

## 2018-01-01 RX ADMIN — Medication 81 MILLIGRAM(S): at 13:44

## 2018-01-01 RX ADMIN — Medication 3 MILLILITER(S): at 05:02

## 2018-01-01 RX ADMIN — Medication 650 MILLIGRAM(S): at 12:55

## 2018-01-01 RX ADMIN — Medication 3 MILLILITER(S): at 05:48

## 2018-01-01 RX ADMIN — MIRTAZAPINE 30 MILLIGRAM(S): 45 TABLET, ORALLY DISINTEGRATING ORAL at 21:43

## 2018-01-01 RX ADMIN — Medication 40 MILLIGRAM(S): at 15:31

## 2018-01-01 RX ADMIN — SODIUM CHLORIDE 1000 MILLILITER(S): 9 INJECTION, SOLUTION INTRAVENOUS at 06:07

## 2018-01-01 RX ADMIN — Medication 100 MILLIGRAM(S): at 06:15

## 2018-01-01 RX ADMIN — LORATADINE 10 MILLIGRAM(S): 10 TABLET ORAL at 11:39

## 2018-01-01 RX ADMIN — Medication 650 MILLIGRAM(S): at 04:18

## 2018-01-01 RX ADMIN — PANTOPRAZOLE SODIUM 40 MILLIGRAM(S): 20 TABLET, DELAYED RELEASE ORAL at 06:05

## 2018-01-01 RX ADMIN — Medication 650 MILLIGRAM(S): at 01:54

## 2018-01-01 RX ADMIN — Medication 25 MILLIGRAM(S): at 06:15

## 2018-01-01 RX ADMIN — BACITRACIN ZINC NEOMYCIN SULFATE POLYMYXIN B SULFATE 1 APPLICATION(S): 400; 3.5; 5 OINTMENT TOPICAL at 06:16

## 2018-01-01 RX ADMIN — HEPARIN SODIUM 5000 UNIT(S): 5000 INJECTION INTRAVENOUS; SUBCUTANEOUS at 06:00

## 2018-01-01 RX ADMIN — Medication 650 MILLIGRAM(S): at 21:31

## 2018-01-01 RX ADMIN — Medication 25 MILLIGRAM(S): at 19:21

## 2018-01-01 RX ADMIN — SODIUM CHLORIDE 75 MILLILITER(S): 9 INJECTION INTRAMUSCULAR; INTRAVENOUS; SUBCUTANEOUS at 12:53

## 2018-01-01 RX ADMIN — BACITRACIN ZINC NEOMYCIN SULFATE POLYMYXIN B SULFATE 1 APPLICATION(S): 400; 3.5; 5 OINTMENT TOPICAL at 17:27

## 2018-01-01 RX ADMIN — Medication 125 MILLIGRAM(S): at 16:38

## 2018-01-01 RX ADMIN — BACITRACIN ZINC NEOMYCIN SULFATE POLYMYXIN B SULFATE 1 APPLICATION(S): 400; 3.5; 5 OINTMENT TOPICAL at 05:29

## 2018-01-01 RX ADMIN — CEFEPIME 100 MILLIGRAM(S): 1 INJECTION, POWDER, FOR SOLUTION INTRAMUSCULAR; INTRAVENOUS at 19:05

## 2018-01-01 RX ADMIN — Medication 650 MILLIGRAM(S): at 17:27

## 2018-01-01 RX ADMIN — Medication 81 MILLIGRAM(S): at 11:38

## 2018-04-07 NOTE — H&P ADULT - ASSESSMENT
77Y F from [facility] with pmh of severe dementia, asthma (nursing home papers say COPD but pt has never smoked), DM2, HTN and GERD sent in for increased work of breathing for 1 day.    SOB, Leucocytosis and fever with LLL opacity on CXR 2/2 pneumonia  -Admit to medicine  -Lactic acid no elevate and pt maintaining blood pressure   -Start levaquin 750mg q48h since pt is from nursing home and has JOE  -tylenol for fever control  -will get ABG to determine if hypercapnea developing  -Respiratory asked to do suctioning   -palliative care consult for goals of care ( informed that i will be placing consult and is in agreement)    JOE likely prerenal as patient looks dehydrated  -in the ED pt was given lasix 40mg 1 dose   -Will start IV fluids @50ml/hr and encourage po intake and if not improving then start IV fluids  -ECHO done in 2015 showed normal EF   -renal bladder sono  -urine osmolality and urine Na    Dementia  -c/w namenda, remeron, donepazil    HTN  -c/w metoprolol    DM2  -carbohydrate consistent diet as pt not on any meds at nursing home   -fingersticks    GERD  -c/w protonix    DVT ppx  -hep suq q    Dispo: PT is full code from nursing home and wants to donate eyes to her children. 77Y F from [facility] with pmh of severe dementia, asthma (nursing home papers say COPD but pt has never smoked), DM2, HTN and GERD sent in for increased work of breathing for 1 day.    SOB, Leucocytosis and fever with LLL opacity on CXR 2/2 pneumonia  -Admit to medicine  -Lactic acid not elevated and pt maintaining blood pressure   -Start levaquin 750mg q48h since pt is from nursing home and has JOE  -tylenol for fever control  -will get ABG to determine if hypercapnea developing  -Respiratory asked to do suctioning   -palliative care consult for goals of care ( informed that i will be placing consult and is in agreement)    JOE with hypernatremia likely prerenal as patient looks dehydrated  -in the ED pt was given lasix 40mg 1 dose   -Will start IV fluids @50ml/hr and encourage po intake   -ECHO done in 2015 showed normal EF   -renal bladder sono  -urine osmolality and urine Na  -2d echo    Dementia  -c/w namenda, remeron, donepazil    HTN  -c/w metoprolol    DM2  -carbohydrate consistent diet as pt not on any meds at nursing home   -fingersticks    GERD  -c/w protonix    DVT ppx  -hep suq q    Dispo: PT is full code from nursing home and wants to donate eyes to her children. 77Y F from [facility] with pmh of severe dementia, asthma (nursing home papers say COPD but pt has never smoked), DM2, HTN and GERD sent in for increased work of breathing for 1 day.    Acute hypoxic/ hypercapneic respiratory failure 2/2 ? COPD exacerbation from sepsis 2/2 possible PNA.    -Lactic acid not elevated and pt maintaining blood pressure   -Start levaquin 750mg q48h since pt is from nursing home and has JOE  -tylenol for fever control  -palliative care consult for goals of care ( informed  and is in agreement)    JOE with hypernatremia likely prerenal as patient looks dehydrated  -in the ED pt was given lasix 40mg 1 dose   -Will start IV fluids @50ml/hr and encourage po intake   -ECHO done in 2015 showed normal EF   -renal bladder sono  -urine osmolality and urine Na  -2d echo  - trend BMP./    Dementia  -c/w namenda, remeron, donepazil    HTN  -c/w metoprolol    DM2  -carbohydrate consistent diet as pt not on any meds at nursing home   -fingersticks    GERD  -c/w protonix    DVT ppx  -hep suq q    Dispo: PT is full code from nursing home and wants to donate eyes to her children.

## 2018-04-07 NOTE — ED PROVIDER NOTE - ATTENDING CONTRIBUTION TO CARE
pt pw sob, resp distress, from NH. pt non verbal. bl rales.  s1s2. ab soft, nd. non focal. will get labs, cxr, ekg, iv lasix, reassess.

## 2018-04-07 NOTE — ED PROVIDER NOTE - CARE PLAN
Principal Discharge DX:	Pneumonia of left lower lobe due to infectious organism  Secondary Diagnosis:	Dementia with behavioral disturbance, unspecified dementia type

## 2018-04-07 NOTE — ED PROVIDER NOTE - OBJECTIVE STATEMENT
77y F w PMH COPD, HTN, advanced dementia nonverbal sent from NH for increased work of breathing today w/ accessory muscle use.

## 2018-04-07 NOTE — CONSULT NOTE ADULT - ASSESSMENT
IMPRESSION:  Acute hypoxic and hypercapnic respiratory failure  Elevated right hemidiaphragm on CXR  HO asthma  JOE      PLAN:  BIPAP prn  IV fluid  Complete antibiotic course  Short course Prednisone for 5 days  clarify code status  Sniff test to rule out diaphragmatic paralysis as out patient  Respiratory therapy: chest percussion   out of bed to chair  lower extremity doppler  DVT prophylaxis IMPRESSION:  Acute hypoxic and hypercapnic respiratory failure  Elevated right hemidiaphragm on CXR  HO asthma  JOE      PLAN:  BIPAP prn  IV fluid if NPO  Feed as tolerated  Complete antibiotic course, PAN culture  Short course Prednisone for 5 days  clarify code status  Respiratory therapy: chest percussion   out of bed to chair  lower extremity doppler  DVT prophylaxis

## 2018-04-07 NOTE — ED PROVIDER NOTE - PHYSICAL EXAMINATION
Constitutional: Well developed, well nourished.   Head: Atraumatic.  Eyes: PERRLA.  ENT: No nasal discharge. Mucous membranes moist.  Neck: Supple. Painless ROM.  Cardiovascular: Regular rhythm. Regular rate. Normal S1 and S2. No murmurs. 2+ pulses in all extremities.   Pulmonary: Tachypnea with increased respiratory effort. Bilateral rales. No wheezing or rhonchi. Bilateral, equal lung expansion.   Abdominal: Soft. Nondistended. Nontender. No guarding.   Extremities. Pelvis stable. No lower extremity edema. Symmetric calves.  Skin: No rashes. Stage 3 sacral decubitus ulcer.   Neuro: Awake. Advanced dementia. Does not follow instructions. Does not track.   Psych: Advanced dementia.

## 2018-04-07 NOTE — H&P ADULT - NSHPSOCIALHISTORY_GEN_ALL_CORE
Marital Status:  ( x )    (   ) Single    (   )    (  )   Occupation: retired  Lives with: (  ) alone  (  ) children   (  ) spouse   (  ) parents  (at nursing home) other    Substance Use (street drugs): ( x ) never used  (  ) other:  Tobacco Usage:  ( x  ) never smoked   (   ) former smoker   (   ) current smoker  (     ) pack year  (        ) last cigarette date  Alcohol Usage: none

## 2018-04-07 NOTE — H&P ADULT - NSHPPHYSICALEXAM_GEN_ALL_CORE
T(F): 101.6 (04-07-18 @ 04:30), Max: 101.6 (04-07-18 @ 04:30)  HR: 98 (04-07-18 @ 05:48) (98 - 100)  BP: 139/81 (04-07-18 @ 05:48) (129/71 - 195/81)  RR: 28 (04-07-18 @ 02:30) (28 - 28)  SpO2: 99% (04-07-18 @ 04:30) (97% - 99%)  PHYSICAL EXAM:  GENERAL: mild respiratory distress breathing with mouth open  HEAD:  Atraumatic, Normocephalic  EYES: EOMI, PERRLA, conjunctiva and sclera clear  NECK: Supple, No JVD  CHEST/LUNG: bilateral congestion/rales. Pt not getting up so can not access from back.   HEART: Regular rate and rhythm; No murmurs;   ABDOMEN: Soft, Nontender, Nondistended; Bowel sounds present; No guarding  EXTREMITIES:  2+ Peripheral Pulses, No cyanosis or edema  PSYCH: AAOx0 pt just says yes and does not focus on voice with minimal concentration  NEUROLOGY: non-focal  SKIN: No rashes or lesions

## 2018-04-07 NOTE — H&P ADULT - HISTORY OF PRESENT ILLNESS
77Y F from [facility] with pmh of severe dementia, asthma (nursing home papers say COPD but pt has never smoked), DM2, HTN and GERD sent in for increased work of breathing for 1 day. I contacted In the ED pt was found to have left lower lobe opacity with fever of 101F. Pt is non verbal and replies with only yes and has a hard time focusing. She is breathing with her mouth but oxygen saturation is 99% on room air. Breathing looks slightly laboured but baseline of patient is unknown. I contacted  who was not aware that wife is in the hospital and he would like her to be full code. He could not provide further information about her. 77Y F from [facility] with pmh of severe dementia, asthma (nursing home papers say COPD but pt has never smoked), DM2, HTN and GERD sent in for increased work of breathing for 1 day. I contacted In the ED pt was found to have left lower lobe opacity with fever of 101F. Pt is non verbal and replies with only yes and has a hard time focusing. She is breathing with her mouth but oxygen saturation is 99% on room air. Breathing looks slightly laboured but baseline of patient is unknown. I contacted  who was not aware that wife is in the hospital and he would like her to be full code. He could not provide further information about her. Patient does not verbalize any complaints on examination and abdominal palpation.

## 2018-04-07 NOTE — H&P ADULT - NSHPLABSRESULTS_GEN_ALL_CORE
12.6   28.03 )-----------( 325      ( 2018 03:14 )             42.1       04-07    149<H>  |  105  |  59<H>  ----------------------------<  199<H>  5.1<H>   |  27  |  2.1<H>    Ca    9.6      2018 03:14    TPro  7.8  /  Alb  3.9  /  TBili  0.8  /  DBili  x   /  AST  13  /  ALT  13  /  AlkPhos  88  04-07          ABG - ( 2018 07:57 )  pH: 7.31  /  pCO2: 52    /  pO2: 129   / HCO3: 26    / Base Excess: ?-0.2 /  SaO2: ?99.6       Urinalysis Basic - ( 2018 05:19 )    Color: Orange / Appearance: Cloudy / S.025 / pH: x  Gluc: x / Ketone: Negative  / Bili: Small / Urobili: 1.0 mg/dL   Blood: x / Protein: 30 mg/dL / Nitrite: Negative   Leuk Esterase: Small / RBC: 2-5 /HPF / WBC 6-10 /HPF   Sq Epi: x / Non Sq Epi: Few /HPF / Bacteria: Few /HPF      CARDIAC MARKERS ( 2018 03:14 )  x     / 0.01 ng/mL / 128 U/L / x     / x

## 2018-04-07 NOTE — H&P ADULT - PMH
Asthma, unspecified asthma severity, unspecified whether complicated, unspecified whether persistent    Essential hypertension    Gastroesophageal reflux disease, esophagitis presence not specified    Type 2 diabetes mellitus with complication, without long-term current use of insulin

## 2018-04-08 NOTE — CONSULT NOTE ADULT - ASSESSMENT
IMPRESSION  Acute respiratory failure in pt with COPD/Asthma, acidosis by PH    Cxray: Right basilar discoid atelectasis, adjacent to elevated right   hemidiaphragm.    Pt with JOE    On: azithromycin 500 mg IV Intermittent every 24 hours  cefepime 1000 mg IV Intermittent every 12 hours  levoFLOXacin 750 mg IV Intermittent every 48 hours      SUGGESTIONs  Await blood cultures,  Start Rocephin 1gm IVPB & continue Zithromax for COPD exacerbatiom  D/C cefepime and D/C levofloxacin  Urine legionella antigen  Repeat white blood cell count  Monitor BS

## 2018-04-08 NOTE — PROGRESS NOTE ADULT - ASSESSMENT
IMPRESSION:  Acute hypercapnic respiratory failure  Elevated right hemidiaphragm on CXR  HO asthma  JOE  Altered mental status   UTI     PLAN:  BIPAP prn  IV fluid if NPO  Complete antibiotic course, PAN culture  Short course Prednisone for 5 days  clarify code status  Respiratory therapy: chest percussion   out of bed to chair  lower extremity doppler  DVT prophylaxis IMPRESSION:  Acute hypercapnic respiratory failure  Elevated right hemidiaphragm on CXR  HO asthma  JOE  Altered mental status   UTI     PLAN:  BIPAP prn  IV fluid if NPO  Complete antibiotic course, PAN culture  Short course Prednisone for 5 days  clarify code status  Respiratory therapy: chest percussion   out of bed to chair  lower extremity doppler  palliative  DVT prophylaxis

## 2018-04-08 NOTE — PROGRESS NOTE ADULT - ASSESSMENT
77Y F from [facility] with pmh of severe dementia, asthma (nursing home papers say COPD but pt has never smoked), DM2, HTN and GERD sent in for increased work of breathing for 1 day.    Acute hypoxic/ hypercapneic respiratory failure 2/2 ? COPD exacerbation from sepsis 2/2 possible PNA.    -Lactic acid not elevated and pt maintaining blood pressure   -Started on multiple IV Abx.  -palliative care consult for goals of care ( informed  and is in agreement)    JOE with hypernatremia likely prerenal as patient looks dehydrated  - started on  IV fluids @50ml/hr and encourage po intake   -ECHO done in 2015 showed normal EF   -renal bladder sono  -urine osmolality and urine Na  -2d echo  - trend BMP./    Dementia  -c/w namenda, remeron, donepazil    HTN  -c/w metoprolol    DM2  -carbohydrate consistent diet as pt not on any meds at nursing home   -fingersticks    GERD  -c/w protonix    DVT ppx  -hep suq q    Dispo: PT is full code from nursing home and wants to donate eyes to her children.     Overall prognosis poor.

## 2018-04-09 NOTE — PROGRESS NOTE ADULT - ASSESSMENT
77Y F from [facility] with pmh of severe dementia, asthma (nursing home papers say COPD but pt has never smoked), DM2, HTN and GERD sent in for increased work of breathing for 1 day.    AMS 2/2 Metabolic Encephalopathy 2/2  Acute hypoxic/ hypercapneic respiratory failure 2/2 ? COPD exacerbation from sepsis 2/2 possible PNA.    -Lactic acid not elevated and pt maintaining blood pressure   -Started on multiple IV Abx. -----> now switched to Azithromycin.  -palliative care consult for goals of care ( informed  and is in agreement)  - will check RVP for FLU.  - Started on tamiflu.    JOE with hypernatremia likely prerenal as patient looks dehydrated.  - IMPROVED S. Cr.  - started on  IV fluids D51/2 NS@50ml/hr and encourage po intake .  -ECHO done in 2015 showed normal EF   -urine osmolality and urine Na    # Dementia  -c/w namenda, remeron, donepazil    HTN  -c/w metoprolol    DM2  -carbohydrate consistent diet as pt not on any meds at nursing home   -fingersticks    GERD  -c/w protonix    DVT ppx  -hep suq q    Dispo: PT is full code from nursing home and wants to donate eyes to her children.     Overall prognosis poor.

## 2018-04-09 NOTE — CONSULT NOTE ADULT - SUBJECTIVE AND OBJECTIVE BOX
Patient is a 77y old  Female who presents with a chief complaint of Respiratory distress x1 day (2018 07:35)      HPI:  77Y F from [facility] with pmh of severe dementia, asthma (nursing home papers say COPD but pt has never smoked), DM2, HTN and GERD sent in for increased work of breathing for 1 day. I contacted In the ED pt was found to have left lower lobe opacity with fever of 101F. Pt is non verbal and replies with only yes and has a hard time focusing. She is breathing with her mouth but oxygen saturation is 99% on room air. Breathing looks slightly laboured but baseline of patient is unknown. I contacted  who was not aware that wife is in the hospital and he would like her to be full code. He could not provide further information about her. Patient does not verbalize any complaints on examination and abdominal palpation. (2018 07:35)      PAST MEDICAL & SURGICAL HISTORY:  Gastroesophageal reflux disease, esophagitis presence not specified  Essential hypertension  Type 2 diabetes mellitus with complication, without long-term current use of insulin  Asthma, unspecified asthma severity, unspecified whether complicated, unspecified whether persistent  H/O knee surgery                               FAMILY HISTORY:  No pertinent family history in first degree relatives          Allergies    No Known Allergies            PHYSICAL EXAM  Vital Signs Last 24 Hrs  T(C): 36.7 (2018 09:06), Max: 38.7 (2018 04:30)  T(F): 98.1 (2018 09:06), Max: 101.6 (2018 04:30)  HR: 88 (2018 09:06) (88 - 100)  BP: 129/75 (2018 09:06) (129/71 - 195/81)  BP(mean): --  RR: 14 (2018 09:06) (14 - 28)  SpO2: 100% (2018 09:06) (97% - 100%)    General:    HEENT: AAO x1           Lymph Nodes: No lymphadenopathy  Neck:  Supple  Lungs: Rhochi bilaterally  Cardiovascular: S1S2  Abdomen: Soft, non tender  Extremities: NO edema or cyanosis  Skin: Intact  Neuro: non focal          LAB:                        12.6   28.03 )-----------( 325      ( 2018 03:14 )             42.1                                               04-07    149<H>  |  105  |  59<H>  ----------------------------<  199<H>  5.1<H>   |  27  |  2.1<H>    Ca    9.6      2018 03:14    TPro  7.8  /  Alb  3.9  /  TBili  0.8  /  DBili  x   /  AST  13  /  ALT  13  /  AlkPhos  88  04-07                                             Urinalysis Basic - ( 2018 05:19 )    Color: Orange / Appearance: Cloudy / S.025 / pH: x  Gluc: x / Ketone: Negative  / Bili: Small / Urobili: 1.0 mg/dL   Blood: x / Protein: 30 mg/dL / Nitrite: Negative   Leuk Esterase: Small / RBC: 2-5 /HPF / WBC 6-10 /HPF   Sq Epi: x / Non Sq Epi: Few /HPF / Bacteria: Few /HPF            CARDIAC MARKERS ( 2018 03:14 )  x     / 0.01 ng/mL / 128 U/L / x     / x                                                LIVER FUNCTIONS - ( 2018 03:14 )  Alb: 3.9 g/dL / Pro: 7.8 g/dL / ALK PHOS: 88 U/L / ALT: 13 U/L / AST: 13 U/L / GGT: x                                                                                            ABG - ( 2018 07:57 )  pH: 7.31  /  pCO2: 52    /  pO2: 129   / HCO3: 26    / Base Excess: ?-0.2 /  SaO2: ?99.6               MEDICATIONS  (STANDING):  aspirin  chewable 81 milliGRAM(s) Oral daily  azithromycin  IVPB 500 milliGRAM(s) IV Intermittent every 24 hours  cefepime  IVPB 1000 milliGRAM(s) IV Intermittent every 12 hours  docusate sodium 100 milliGRAM(s) Oral two times a day  donepezil 10 milliGRAM(s) Oral at bedtime  heparin  Injectable 5000 Unit(s) SubCutaneous every 8 hours  loratadine 10 milliGRAM(s) Oral daily  memantine 5 milliGRAM(s) Oral daily  metoprolol tartrate 25 milliGRAM(s) Oral two times a day  mirtazapine 30 milliGRAM(s) Oral at bedtime  neomycin/BACItracin/polymyxin Topical Ointment 1 Application(s) Topical two times a day  oxyCODONE    5 mG/acetaminophen 325 mG 1 Tablet(s) Oral at bedtime  pantoprazole    Tablet 40 milliGRAM(s) Oral before breakfast  sodium chloride 0.45%. 1000 milliLiter(s) (50 mL/Hr) IV Continuous <Continuous>    MEDICATIONS  (PRN):  acetaminophen   Tablet 650 milliGRAM(s) Oral every 6 hours PRN For Temp greater than 38 C (100.4 F)
78y/o female with significant dementia, now admitted with shortness of breath, tachypnea and fever patient  is on droplet isolation and has received tamiflu and on antibiotics for infection +/- fluid overload  Patient is awake but unable to answer any questions.  Her respiratory rate is 32/ minute
KATELYN BLANCO 77yFemalePatient is a 77y old  Female who presents with a chief complaint of Respiratory distress x1 day (2018 07:35)      Patient has history of:  No Known Allergies      PNEUMONIA OF LEFT LOWER LOBE INFECIOUS ORGANISM; DEMENTIA WITH BEHAVIORAL  ^PNEUMONIA OF LEFT LOWER LOBE INFECIOUS ORGANISM; DEMENTIA WITH BEHAVIORAL  No h/o HF  No pertinent family history in first degree relatives  MEWS Score  Gastroesophageal reflux disease, esophagitis presence not specified  Essential hypertension  Type 2 diabetes mellitus with complication, without long-term current use of insulin  Asthma, unspecified asthma severity, unspecified whether complicated, unspecified whether persistent  Pneumonia of left lower lobe due to infectious organism  H/O knee surgery  RESP DISTRESS  Dementia with behavioral disturbance, unspecified dementia type        Patient treated with:  azithromycin  IVPB 500 milliGRAM(s) IV Intermittent every 24 hours  cefepime  IVPB 1000 milliGRAM(s) IV Intermittent every 12 hours  levoFLOXacin IVPB 750 milliGRAM(s) IV Intermittent every 48 hours        PHYSICAL EXAM  T(F): 99.1 (18 @ 05:07), Max: 100.4 (18 @ 20:38)  HR: 92 (18 @ 05:07) (92 - 107)  BP: 118/67 (18 @ 05:07) (118/67 - 178/67)  RR: 20 (18 @ 05:07) (18 - 20)  SpO2: 99% (18 @ 12:05) (99% - 99%)  Daily Height in cm: 157.48 (2018 14:53)    Daily Weight in k.4 (2018 14:53)  HEENT: normal, no nuchal rigidity  Cor: RSR Nl S1 S2  Lungs: clear  Decreased breath sounds at bases    Abdomen: Nontender, Nl BS,     Ext: No clubbing,cyanosis or edema    LAB & RADIOLOGIC RESULTS:                        12.6   28.03 )-----------( 325      ( 2018 03:14 )             42.1             146  |  103  |  x   ----------------------------<  206<H>  4.6   |  26  |  1.9<H>    Mg     2.3         TPro  6.7  /  Alb  2.9<L>  /  TBili  0.5  /  DBili  x   /  AST  12  /  ALT  10  /  AlkPhos  117<H>  04-08    <--<9>>7.31  <--<9>>1.5    Lactate, Blood: 1.0 mmol/L (18 @ 06:58)  Blood Gas Venous - Lactate: 1.5 mmoL/L (18 @ 04:32)             Creatinine, Serum: 1.9 mg/dL (18 @ 06:58)  eGFR if Non African American: 25 mL/min/1.73M2 (18 @ 06:58)  eGFR if : 29 mL/min/1.73M2 (18 @ 06:58)    CKD5  Acute Kidney Injury    ABG - ( 2018 07:57 )  pH: 7.31  /  pCO2: 52    /  pO2: 129   / HCO3: 26    / Base Excess: ?-0.2 /  SaO2: ?99.6

## 2018-04-09 NOTE — PROGRESS NOTE ADULT - ASSESSMENT
Viral bronchitis  R/O Flu.  No evidence of PNA.  Check inflenza antigen.  Start Tamiflu 75 mg po q12h  Droplet precautions.  Blood/Urine cultures are negative.  D/C cefepime and Levo.  Azithromycin 500mg po q24h.

## 2018-04-09 NOTE — CONSULT NOTE ADULT - ASSESSMENT
I have spoken with the patients  and have explained palliative care.  We have tentatively set up a meeting for 1pm on 4/10  Patient is to receive diuretic.  Suggest consider low dose 5 mg morphine for breathlessness if breathing problem persists    Discussed with house staff

## 2018-04-10 NOTE — PROGRESS NOTE ADULT - ASSESSMENT
77Y F from [facility] with pmh of severe dementia, asthma (nursing home papers say COPD but pt has never smoked), DM2, HTN and GERD sent in for increased work of breathing for 1 day.    AMS 2/2 Metabolic Encephalopathy 2/2  Acute hypoxic/ hypercapneic respiratory failure 2/2 ? COPD exacerbation from sepsis 2/2 possible PNA.    -Lactic acid not elevated and pt maintaining blood pressure   -Started on multiple IV Abx. -----> now switched to Azithromycin.  -palliative care consult for goals of care ( informed  and is in agreement)  - will check RVP for FLU.  - Started on tamiflu.    JOE with hypernatremia likely prerenal as patient looks dehydrated.  - started on  IV fluids D51/2 NS@50ml/hr and encourage po intake .  -ECHO done in 2015 showed normal EF   -urine osmolality and urine Na    # Dementia  -c/w namenda, remeron, donepazil    HTN  -c/w metoprolol    DM2  -carbohydrate consistent diet as pt not on any meds at nursing home   -fingersticks    GERD  -c/w protonix    DVT ppx  -hep suq q    Dispo: PT is full code from nursing home and wants to donate eyes to her children.     Overall prognosis poor.  Awaiting family meeting with palliative team today. 77Y F from [facility] with pmh of severe dementia, asthma (nursing home papers say COPD but pt has never smoked), DM2, HTN and GERD sent in for increased work of breathing for 1 day.    AMS 2/2 Metabolic Encephalopathy 2/2  Acute hypoxic/ hypercapneic respiratory failure 2/2 ? COPD exacerbation from sepsis 2/2 possible PNA.    -Lactic acid not elevated and pt maintaining blood pressure   -Started on multiple IV Abx. -----> now switched to Azithromycin.  -palliative care consult for goals of care ( informed  and is in agreement)  - will check RVP for FLU.  - Started on tamiflu.    JOE with hypernatremia likely prerenal as patient looks dehydrated.  - Raised   IV fluids D51/2 NS@100 ml/hr for 12 hours, then re assess  .  - would consider NGT for free water, but the family refused NGT.  -ECHO  showed normal EF     # Dementia  -c/w namenda, remeron, donepazil    HTN  -c/w metoprolol    DM2  -carbohydrate consistent diet as pt not on any meds at nursing home   -fingersticks    GERD  -c/w protonix    DVT ppx  -hep suq q    Dispo: PT is full code from nursing home and wants to donate eyes to her children.     Overall prognosis poor.  As per  family meeting with palliative team today, will continue current medical mgmt for two more days  then re-evaluate.  Hospice consult.

## 2018-04-10 NOTE — PROVIDER CONTACT NOTE (OTHER) - RECOMMENDATIONS
as per dr benson #[id number] he will change tylenol suppository PRN order to q4 hours. continue with ice packs, non further nursing interventions at this time per dr benson. will continue to monitor.

## 2018-04-10 NOTE — PROVIDER CONTACT NOTE (OTHER) - BACKGROUND
pt had temp of 103.5 with prior RN and tylenol suppository given, on shift change this RN repeated rectal temp and reading is 102.7. dr benson #[id number] notified.

## 2018-04-10 NOTE — GOALS OF CARE CONVERSATION - PERSONAL ADVANCE DIRECTIVE - CONVERSATION DETAILS
Family meeting held to discuss patient's medical condition, current treatment and prognosis.  All questions answered in detail.  Patient's family inform that patient is a long term resident at [facility], and family appear to have a clear understanding of pt.'s medical condition overall.  All treatment options presented, aggressive measures vs. comfort care with hospice.  Family at this time have decided to continue with antibiotics and antivirals, but express their wish for comfort measures should pt. not improve.  Hospice consult placed. Family meeting held to discuss patient's medical condition, current treatment and prognosis.  All questions answered in detail.  Patient's family inform that patient is a long term resident at [facility], and family appear to have a clear understanding of pt.'s medical condition overall.  All treatment options presented, aggressive measures vs. comfort care with hospice.  Family at this time have decided to continue with antibiotics and antivirals, but express their wish for comfort measures should pt. not improve.  Hospice consult placed.    4/12/18 at 1:30pm - F/U goals of care conversation with family.  Pt. has not made a purposeful recovery at this time and family wishes for bi-pap removal and comfort measures only.

## 2018-04-10 NOTE — PROVIDER CONTACT NOTE (OTHER) - SITUATION
MD Armas made aware of pt's temp 102.6 F (rectal). MD will order cooling blanket. Will start intervention once material available. Will continue to monitor and assess.

## 2018-04-10 NOTE — PROGRESS NOTE ADULT - ASSESSMENT
Viral bronchitis  R/O Flu.  Flu antigen still pending ?  Should be available today if it is done.  No evidence of PNA.  Tamiflu 75 mg po q12h  Droplet precautions.  Blood/Urine cultures are negati  Azithromycin 500mg po q24h.

## 2018-04-10 NOTE — PROVIDER CONTACT NOTE (OTHER) - ASSESSMENT
pt given ice packs and as per dr benson #[id number] no further interventions at this time. awaiting ID recommendation to potentially start antibiotics.

## 2018-04-11 NOTE — PROGRESS NOTE ADULT - ASSESSMENT
77Y F from [facility] with pmh of severe dementia, asthma (nursing home papers say COPD but pt has never smoked), DM2, HTN and GERD sent in for increased work of breathing for 1 day.    AMS 2/2 Metabolic Encephalopathy 2/2  Acute hypoxic/ hypercapneic respiratory failure 2/2 ? COPD exacerbation from sepsis 2/2 possible PNA.  - bipap dependent currently, c/w supportive care  -low dose opiate analgesia + prn for air hunger    JOE with hypernatremia likely prerenal as patient looks dehydrated.  - gentle ivh, not eating while bipap dependent     # Dementia  -c/w namenda, remeron, donepazil    HTN  -c/w metoprolol    DM2  -carbohydrate consistent diet as pt not on any meds at nursing home   -fingersticks    GERD  -c/w protonix    DVT ppx  -hep sc      Dispo- DNR/DNI; c/w current level of care. family receptive for hospice care with snf  high risk, guarded prognosis  D/W family at bedside

## 2018-04-11 NOTE — PROGRESS NOTE ADULT - ASSESSMENT
IMPRESSION:    No evidence of Flu/Viral pathogens.  Possibly RLL bacterial PNA secondary to aspiration.    RECOMMENDATIONS:    Zosyn 3.375mg q8h.  D/C other antibiotics.

## 2018-04-12 NOTE — DIETITIAN INITIAL EVALUATION ADULT. - ORAL INTAKE PTA
unable to obtain nutrition hx at this time as pt is noted to be somnolent, lethargic, unable to speak with severe dementia; pt asleep at time of visit unable to obtain nutrition hx at this time as pt is noted to be somnolent, lethargic, unable to speak with severe dementia

## 2018-04-12 NOTE — DIETITIAN INITIAL EVALUATION ADULT. - DIET TYPE
DASH/TLC (sodium and cholesterol restricted diet)/consistent carbohydrate (no snacks)/dysphagia 1, pureed, nectar consistency fluid/per flowsheets pt noted to be NPO; on BiPAP since 4/10

## 2018-04-12 NOTE — PROGRESS NOTE ADULT - ASSESSMENT
IMPRESSION:  No evidence of Flu/Viral pathogens.  Possibly RLL bacterial PNA secondary to aspiration.  WBC increasing  RECOMMENDATIONS:  Zosyn 3.375mg q8h.

## 2018-04-12 NOTE — PROGRESS NOTE ADULT - ATTENDING COMMENTS
Patient seen and examined independently of resident. Case discussed with housestaff, nursing and patient, family

## 2018-04-12 NOTE — PROGRESS NOTE ADULT - EXTREMITIES
No cyanosis, clubbing or edema
No cyanosis, clubbing or edema
detailed exam
No cyanosis, clubbing or edema

## 2018-04-12 NOTE — PROGRESS NOTE ADULT - GASTROINTESTINAL
Soft, non-tender, no hepatosplenomegaly, normal bowel sounds
Soft, non-tender, no hepatosplenomegaly, normal bowel sounds
detailed exam
Soft, non-tender, no hepatosplenomegaly, normal bowel sounds

## 2018-04-12 NOTE — DIETITIAN INITIAL EVALUATION ADULT. - OTHER INFO
Pt presents with a chief complaint of Respiratory distress x1. Acute hypoxic/ hypercapneic respiratory failure 2/2 ? COPD exacerbation from sepsis 2/2 possible PNA more likely viral infection, acidosis by PH. Palliative care: DNR/DNI, if she does not respond to treatment in the next 2 days (per MD note on 4/11), family wishes would be to speak with hospice and consider comfort measures only. JOE with hypernatremia likely prerenal as patient looks dehydrated. Unable to obtain UBW. (Reason for assessment: RD screen, poor po)

## 2018-04-12 NOTE — PROGRESS NOTE ADULT - RESPIRATORY
Breath Sounds equal & clear to percussion & auscultation, no accessory muscle use
detailed exam
Breath Sounds equal & clear to percussion & auscultation, no accessory muscle use
detailed exam

## 2018-04-12 NOTE — PROGRESS NOTE ADULT - SUBJECTIVE AND OBJECTIVE BOX
SUBJECTIVE:    Patient is a 77y old  Female who presents with a chief complaint of Respiratory distress x1 day (07 Apr 2018 07:35)    Currently admitted to medicine with the primary diagnosis of Pneumonia of left lower lobe due to infectious organism     Today is hospital day 2d. This morning she is resting comfortably in bed and reports no new issues or overnight events.     PAST MEDICAL & SURGICAL HISTORY  PAST MEDICAL & SURGICAL HISTORY:  Gastroesophageal reflux disease, esophagitis presence not specified  Essential hypertension  Type 2 diabetes mellitus with complication, without long-term current use of insulin  Asthma, unspecified asthma severity, unspecified whether complicated, unspecified whether persistent  H/O knee surgery    SOCIAL HISTORY:    ALLERGIES:  No Known Allergies    MEDICATIONS:  STANDING MEDICATIONS  aspirin  chewable 81 milliGRAM(s) Oral daily  azithromycin  IVPB 500 milliGRAM(s) IV Intermittent every 24 hours  docusate sodium 100 milliGRAM(s) Oral two times a day  donepezil 10 milliGRAM(s) Oral at bedtime  heparin  Injectable 5000 Unit(s) SubCutaneous every 8 hours  loratadine 10 milliGRAM(s) Oral daily  memantine 5 milliGRAM(s) Oral daily  metoprolol tartrate 25 milliGRAM(s) Oral two times a day  mirtazapine 30 milliGRAM(s) Oral at bedtime  neomycin/BACItracin/polymyxin Topical Ointment 1 Application(s) Topical two times a day  oseltamivir 30 milliGRAM(s) Oral every 12 hours  oxyCODONE    5 mG/acetaminophen 325 mG 1 Tablet(s) Oral at bedtime  pantoprazole    Tablet 40 milliGRAM(s) Oral before breakfast    PRN MEDICATIONS  acetaminophen   Tablet 650 milliGRAM(s) Oral every 6 hours PRN    VITALS:   T(F): 98.1  HR: 126  BP: 139/76  RR: 28  SpO2: 95%    HOME MEDS: </u  acetaminophen 650 mg oral tablet: 1 tab(s) orally 2 times a day  aspirin 81 mg oral tablet, chewable: 1 tab(s) orally once a day  docusate sodium 100 mg oral tablet: 1 tab(s) orally 2 times a day  DONEPEZIL    TAB 10MG ODT:   Enemeez Mini 283 mg rectal enema: 1 each rectal 3 to 4 times a week, As Needed  Fosamax 70 mg oral tablet: 1 tab(s) orally once a week on Monday  IPRATROPIUM/ SOL ALBUTER:   loratadine 10 mg oral tablet: 1 tab(s) orally once a day  MEMANTINE    TAB HCL 5MG:   METOPROL TAR TAB 25MG:   MIRTAZAPINE  TAB 30MG:   OMEPRAZOLE   CAP 20MG:   OXYCOD/APAP  TAB 5-325MG:   Triple Antibiotic topical ointment: Apply topically to affected area 2 times a day. L outer hand      LABS:                        10.6   20.13 )-----------( 322      ( 09 Apr 2018 06:45 )             35.7     04-09    152<H>  |  106  |  74<HH>  ----------------------------<  173<H>  4.4   |  30  |  1.5    Ca    8.9      09 Apr 2018 06:45  Mg     2.3     04-08    TPro  6.7  /  Alb  2.9<L>  /  TBili  0.5  /  DBili  x   /  AST  12  /  ALT  10  /  AlkPhos  117<H>  04-08        ABG - ( 08 Apr 2018 11:10 )  pH: 7.35  /  pCO2: 53    /  pO2: 53    / HCO3: 29    / Base Excess: 1.9   /  SaO2: 100       Culture - Blood (collected 07 Apr 2018 11:58)  Source: .Blood None  Preliminary Report (08 Apr 2018 18:01):    No growth to date.    Culture - Urine (collected 07 Apr 2018 05:19)  Source: .Urine Catheterized  Final Report (08 Apr 2018 10:59):    No growth    RADIOLOGY:  < from: Xray Chest 1 View-PORTABLE IMMEDIATE (04.09.18 @ 15:46) >  Impression:      Increasing right base atelectasis    < end of copied text >  < from: [facility] Duplex Lower Ext Vein Scan, Bilat (04.07.18 @ 17:08) >  Impression:    No evidence of deep venous thrombosis or superficial thrombophlebitis in   bilateral lower extremities.    < end of copied text >    PHYSICAL EXAM:  GEN: No acute distress  HEENT: WNL  LUNGS: b/l crackles  HEART: S1/S2 present. RRR.   ABD: Soft, non-tender, non-distended. Bowel sounds present  EXT: no LE edema  NEURO: not following commands, nonverbal, reacts to pain    ASSESSMENT/PLAN:  77Y F from [facility] with pmh of severe dementia, asthma (nursing home papers say COPD but pt has never smoked), DM2, HTN and GERD sent in for increased work of breathing for 1 day.    Acute hypoxic/ hypercapneic respiratory failure 2/2 ? COPD exacerbation from sepsis 2/2 possible PNA, acidosis by PH  - Pt developed acute SOB on 4/9 in PM, CXR unchanged but had b/l crackles and desat to 94%, placed on NRB, IV lasix, nebs, d/c IV d5 1/2 NS  -Lactic acid not elevated and pt maintaining blood pressure   - palliative care consult for goals of care- tried to reach  and daughter but failed attempt  - ID: D/C cefepime and D/C levofloxacin, Viral bronchitis, R/O Flu, No evidence of PNA, Check inflenza antigen, Start Tamiflu 75 mg po q12h  Droplet precautions, Blood/Urine cultures are negative, D/C cefepime and Levo. Azithromycin 500mg po q24h. Urine legionella antigen,   - Pulm: BIPAP prn, IV fluid if NPO, Complete antibiotic course, PAN culture, Short course Prednisone for 5 days, Respiratory therapy: chest percussion , lower extremity doppler neg      JOE with hypernatremia likely prerenal as patient looks dehydrated  - started on  IV fluids @50ml/hr and encourage po intake   - ECHO done in 2015 showed normal EF   - renal bladder sono  - urine osmolality and urine Na  - 2d echo  - trend BMP    Dementia  -c/w namenda, remeron, donepazil    HTN  -c/w metoprolol    DM2  -carbohydrate consistent diet as pt not on any meds at nursing home   -fingersticks    GERD  -c/w protonix    DVT ppx  -hep suq q    OOB--> chair  Dispo: PT is full code from nursing home and wants to donate eyes to her children.     Overall prognosis poor.
SUBJECTIVE:    Patient is a 77y old  Female who presents with a chief complaint of Respiratory distress x1 day (07 Apr 2018 07:35)    Currently admitted to medicine with the primary diagnosis of Pneumonia of left lower lobe due to infectious organism     Today is hospital day 5d. No new issues or overnight events.     PAST MEDICAL & SURGICAL HISTORY  PAST MEDICAL & SURGICAL HISTORY:  Gastroesophageal reflux disease, esophagitis presence not specified  Essential hypertension  Type 2 diabetes mellitus with complication, without long-term current use of insulin  Asthma, unspecified asthma severity, unspecified whether complicated, unspecified whether persistent  H/O knee surgery    SOCIAL HISTORY:    ALLERGIES:  No Known Allergies    MEDICATIONS:  STANDING MEDICATIONS  docusate sodium 100 milliGRAM(s) Oral two times a day  glycopyrrolate Injectable 0.4 milliGRAM(s) IV Push once  mirtazapine 30 milliGRAM(s) Oral at bedtime  morphine  - Injectable 4 milliGRAM(s) IV Push once  neomycin/BACItracin/polymyxin Topical Ointment 1 Application(s) Topical two times a day    PRN MEDICATIONS  glycopyrrolate Injectable 0.2 milliGRAM(s) IV Push every 6 hours PRN  LORazepam   Injectable 1 milliGRAM(s) IV Push every 4 hours PRN  morphine  - Injectable 2 milliGRAM(s) IV Push every 4 hours PRN    VITALS:   T(F): 101.9  HR: 105  BP: 162/85  RR: 25  SpO2: 93%    HOME MEDS: </u  acetaminophen 650 mg oral tablet: 1 tab(s) orally 2 times a day  aspirin 81 mg oral tablet, chewable: 1 tab(s) orally once a day  docusate sodium 100 mg oral tablet: 1 tab(s) orally 2 times a day  DONEPEZIL    TAB 10MG ODT:   Enemeez Mini 283 mg rectal enema: 1 each rectal 3 to 4 times a week, As Needed  Fosamax 70 mg oral tablet: 1 tab(s) orally once a week on Monday  IPRATROPIUM/ SOL ALBUTER:   loratadine 10 mg oral tablet: 1 tab(s) orally once a day  MEMANTINE    TAB HCL 5MG:   METOPROL TAR TAB 25MG:   MIRTAZAPINE  TAB 30MG:   OMEPRAZOLE   CAP 20MG:   OXYCOD/APAP  TAB 5-325MG:   Triple Antibiotic topical ointment: Apply topically to affected area 2 times a day. L outer hand      LABS:                        11.5   26.61 )-----------( 416      ( 12 Apr 2018 06:31 )             40.4     04-12    160<H>  |  117<H>  |  79<HH>  ----------------------------<  258<H>  4.9   |  32  |  1.5    Ca    8.8      12 Apr 2018 06:31  Mg     2.5     04-12      RADIOLOGY:    PHYSICAL EXAM:  GEN: Agonal breathing  HEENT: WNL  LUNGS: b/l crackles  HEART: S1/S2 present. RRR.   ABD: Soft, non-tender, non-distended. Bowel sounds present  EXT: no LE edema  NEURO: nonverbal, not following commands        ASSESSMENT/PLAN:  77Y F from [facility] with pmh of severe dementia, asthma (nursing home papers say COPD but pt has never smoked), DM2, HTN and GERD sent in for increased work of breathing for 1 day.   admitted with metabolic encephalopathy, acute combined hypoxic/hypercarbic respiratory failure secondary to PNA and COPD exacerbation  clinically deteriorating    held family meeting with palliative care/family  goal of care, comfort oriented  DNR/DNI  opiates analgesia/air hunger  on morphine drip  supplemental O2  withdraw NIPPV  counselling/support provided to family
FRANCIS KATELYN  77y  Female      Patient is a 77y old  Female who presents with a chief complaint of Respiratory distress x1 day (07 Apr 2018 07:35)      INTERVAL HPI/OVERNIGHT EVENTS:      ******************************* REVIEW OF SYSTEMS:**********************************************    Unable to obtain for patient's poor mental status.    All other review of systems negative    *********************** VITALS ******************************************    T(F): 100.3 (04-09-18 @ 06:00)  HR: 102 (04-09-18 @ 06:00) (90 - 107)  BP: 152/85 (04-09-18 @ 06:00) (125/78 - 154/78)  RR: 20 (04-09-18 @ 03:50) (18 - 20)  SpO2: 95% (04-09-18 @ 06:00) (95% - 95%)    04-08-18 @ 07:01  -  04-09-18 @ 07:00  --------------------------------------------------------  IN: 0 mL / OUT: 3 mL / NET: -3 mL            04-08-18 @ 07:01  -  04-09-18 @ 07:00  --------------------------------------------------------  IN: 0 mL / OUT: 3 mL / NET: -3 mL        ******************************** PHYSICAL EXAM:**************************************************  GENERAL: NAD    PSYCH: no agitation,  HEENT:     NERVOUS SYSTEM:  Alert & Oriented X0-1,     PULMONARY: GERRI, CTA    CARDIOVASCULAR: S1S2 RRR    GI: Soft, NT, ND; BS present.    EXTREMITIES:  2+ Peripheral Pulses, No clubbing, cyanosis, or edema    LYMPH: No lymphadenopathy noted    SKIN: No rashes or lesions    ******************************************************************************************    Consultant(s) Notes Reviewed:  [x ] YES  [ ] NO    Discussed with Consultants/Other Providers [ x] YES     **************************** LABS *******************************************************                          10.6   20.13 )-----------( 322      ( 09 Apr 2018 06:45 )             35.7     04-09    152<H>  |  106  |  74<HH>  ----------------------------<  173<H>  4.4   |  30  |  1.5    Ca    8.9      09 Apr 2018 06:45  Mg     2.3     04-08    TPro  6.7  /  Alb  2.9<L>  /  TBili  0.5  /  DBili  x   /  AST  12  /  ALT  10  /  AlkPhos  117<H>  04-08    ABG - ( 08 Apr 2018 11:10 )  pH: 7.35  /  pCO2: 53    /  pO2: 53    / HCO3: 29    / Base Excess: 1.9   /  SaO2: 100                   Lactate Trend  04-08 @ 06:58 Lactate:1.0         CAPILLARY BLOOD GLUCOSE  222 (09 Apr 2018 08:17)              **************************Active Medications *******************************************  No Known Allergies      acetaminophen   Tablet 650 milliGRAM(s) Oral every 6 hours PRN  aspirin  chewable 81 milliGRAM(s) Oral daily  azithromycin  IVPB 500 milliGRAM(s) IV Intermittent every 24 hours  dextrose 5% + sodium chloride 0.45%. 1000 milliLiter(s) IV Continuous <Continuous>  docusate sodium 100 milliGRAM(s) Oral two times a day  donepezil 10 milliGRAM(s) Oral at bedtime  heparin  Injectable 5000 Unit(s) SubCutaneous every 8 hours  loratadine 10 milliGRAM(s) Oral daily  memantine 5 milliGRAM(s) Oral daily  metoprolol tartrate 25 milliGRAM(s) Oral two times a day  mirtazapine 30 milliGRAM(s) Oral at bedtime  neomycin/BACItracin/polymyxin Topical Ointment 1 Application(s) Topical two times a day  oseltamivir 30 milliGRAM(s) Oral every 12 hours  oxyCODONE    5 mG/acetaminophen 325 mG 1 Tablet(s) Oral at bedtime  pantoprazole    Tablet 40 milliGRAM(s) Oral before breakfast      ***************************************************  RADIOLOGY & ADDITIONAL TESTS:    Imaging Personally Reviewed:  [ ] YES  [ ] NO    HEALTH ISSUES - PROBLEM Dx:
FRANCIS KATELYN  77y  Female      Patient is a 77y old  Female who presents with a chief complaint of Respiratory distress x1 day (07 Apr 2018 07:35)      INTERVAL HPI/OVERNIGHT EVENTS:      ******************************* REVIEW OF SYSTEMS:**********************************************    Unable to obtain for patient's poor mental status.  All other review of systems negative    *********************** VITALS ******************************************    T(F): 99.8 (04-10-18 @ 03:35)  HR: 109 (04-10-18 @ 04:01) (96 - 126)  BP: 179/96 (04-10-18 @ 04:01) (139/76 - 179/96)  RR: 20 (04-09-18 @ 21:29) (20 - 28)  SpO2: 98% (04-09-18 @ 21:30) (98% - 98%)            ******************************** PHYSICAL EXAM:**************************************************  GENERAL: NAD    PSYCH:  HEENT:     NERVOUS SYSTEM:  Alert & Oriented X0,   PULMONARY: decreased GERRI, Crackles B/B    CARDIOVASCULAR: S1S2 RRR    GI: Soft, NT, ND; BS present.    EXTREMITIES:  2+ Peripheral Pulses, No clubbing, cyanosis, or edema    LYMPH: No lymphadenopathy noted    SKIN: No rashes or lesions    ******************************************************************************************    Consultant(s) Notes Reviewed:  [x ] YES  [ ] NO    Discussed with Consultants/Other Providers [ x] YES     **************************** LABS *******************************************************                          10.6   17.86 )-----------( 365      ( 10 Apr 2018 05:32 )             35.2     04-10    154<H>  |  106  |  86<HH>  ----------------------------<  270<H>  4.4   |  28  |  1.7<H>    Ca    9.0      10 Apr 2018 05:32  Mg     2.6     04-10      ABG - ( 08 Apr 2018 11:10 )  pH: 7.35  /  pCO2: 53    /  pO2: 53    / HCO3: 29    / Base Excess: 1.9   /  SaO2: 100                   Lactate Trend  04-08 @ 06:58 Lactate:1.0         CAPILLARY BLOOD GLUCOSE  277 (09 Apr 2018 21:24)              **************************Active Medications *******************************************  No Known Allergies      acetaminophen   Tablet 650 milliGRAM(s) Oral every 6 hours PRN  acetaminophen  Suppository 650 milliGRAM(s) Rectal every 6 hours  aspirin  chewable 81 milliGRAM(s) Oral daily  azithromycin  IVPB 500 milliGRAM(s) IV Intermittent every 24 hours  docusate sodium 100 milliGRAM(s) Oral two times a day  heparin  Injectable 5000 Unit(s) SubCutaneous every 8 hours  metoprolol tartrate 25 milliGRAM(s) Oral two times a day  mirtazapine 30 milliGRAM(s) Oral at bedtime  neomycin/BACItracin/polymyxin Topical Ointment 1 Application(s) Topical two times a day  oseltamivir 30 milliGRAM(s) Oral every 12 hours  oxyCODONE    5 mG/acetaminophen 325 mG 1 Tablet(s) Oral at bedtime  pantoprazole    Tablet 40 milliGRAM(s) Oral before breakfast      ***************************************************  RADIOLOGY & ADDITIONAL TESTS:    Imaging Personally Reviewed:  [ ] YES  [ ] NO    HEALTH ISSUES - PROBLEM Dx:  Heart failure: Heart failure  Dementia with behavioral disturbance, unspecified dementia type: Dementia with behavioral disturbance, unspecified dementia type  Pneumonia of left lower lobe due to infectious organism: Pneumonia of left lower lobe due to infectious organism  Palliative care encounter: Palliative care encounter  Goals of care, counseling/discussion: Goals of care, counseling/discussion
FRANCIS KATELYN  77y  Female      Patient is a 77y old  Female who presents with a chief complaint of Respiratory distress x1 day (2018 07:35)      INTERVAL HPI/OVERNIGHT EVENTS:      ******************************* REVIEW OF SYSTEMS:**********************************************    unable to obtain for patient's mental status.  All other review of systems negative    *********************** VITALS ******************************************    T(F): 99.1 (18 @ 05:07)  HR: 92 (18 05:07) (92 - 107)  BP: 118/67 (18 @ 05:07) (118/67 - 178/67)  RR: 20 (18 @ 05:07) (18 - 20)  SpO2: 99% (18 @ 12:05) (99% - 99%)    18 07:01  -  18 @ 07:00  --------------------------------------------------------  IN: 0 mL / OUT: 3 mL / NET: -3 mL            18 @ 07:01  -  18 @ 07:00  --------------------------------------------------------  IN: 0 mL / OUT: 3 mL / NET: -3 mL        ******************************** PHYSICAL EXAM:**************************************************  GENERAL: NAD    PSYCH:   HEENT:     NERVOUS SYSTEM:  Alert & Oriented X1-2, not following commands. just moaning.    PULMONARY: GERRI, crackles B/B    CARDIOVASCULAR: S1S2 RRR    GI: Soft, NT, ND; BS present.    EXTREMITIES:  2+ Peripheral Pulses, No clubbing, cyanosis, or edema    LYMPH: No lymphadenopathy noted    SKIN: No rashes or lesions    ******************************************************************************************    Consultant(s) Notes Reviewed:  [x ] YES  [ ] NO    Discussed with Consultants/Other Providers [ x] YES     **************************** LABS *******************************************************                          12.6   28.03 )-----------( 325      ( 2018 03:14 )             42.1         149<H>  |  105  |  59<H>  ----------------------------<  199<H>  5.1<H>   |  27  |  2.1<H>    Ca    9.6      2018 03:14    TPro  7.8  /  Alb  3.9  /  TBili  0.8  /  DBili  x   /  AST  13  /  ALT  13  /  AlkPhos  88      ABG - ( 2018 07:57 )  pH: 7.31  /  pCO2: 52    /  pO2: 129   / HCO3: 26    / Base Excess: ?-0.2 /  SaO2: ?99.6             Urinalysis Basic - ( 2018 05:19 )    Color: Orange / Appearance: Cloudy / S.025 / pH: x  Gluc: x / Ketone: Negative  / Bili: Small / Urobili: 1.0 mg/dL   Blood: x / Protein: 30 mg/dL / Nitrite: Negative   Leuk Esterase: Small / RBC: 2-5 /HPF / WBC 6-10 /HPF   Sq Epi: x / Non Sq Epi: Few /HPF / Bacteria: Few /HPF        Lactate Trend  -08 @ 06:58 Lactate:1.0     CARDIAC MARKERS ( 2018 03:14 )  x     / 0.01 ng/mL / 128 U/L / x     / x          CAPILLARY BLOOD GLUCOSE  219 (2018 07:46)              **************************Active Medications *******************************************  No Known Allergies      acetaminophen   Tablet 650 milliGRAM(s) Oral every 6 hours PRN  aspirin  chewable 81 milliGRAM(s) Oral daily  azithromycin  IVPB 500 milliGRAM(s) IV Intermittent every 24 hours  cefepime  IVPB 1000 milliGRAM(s) IV Intermittent every 12 hours  docusate sodium 100 milliGRAM(s) Oral two times a day  donepezil 10 milliGRAM(s) Oral at bedtime  heparin  Injectable 5000 Unit(s) SubCutaneous every 8 hours  levoFLOXacin IVPB 750 milliGRAM(s) IV Intermittent every 48 hours  loratadine 10 milliGRAM(s) Oral daily  memantine 5 milliGRAM(s) Oral daily  metoprolol tartrate 25 milliGRAM(s) Oral two times a day  mirtazapine 30 milliGRAM(s) Oral at bedtime  neomycin/BACItracin/polymyxin Topical Ointment 1 Application(s) Topical two times a day  oxyCODONE    5 mG/acetaminophen 325 mG 1 Tablet(s) Oral at bedtime  pantoprazole    Tablet 40 milliGRAM(s) Oral before breakfast  sodium chloride 0.45%. 1000 milliLiter(s) IV Continuous <Continuous>      ***************************************************  RADIOLOGY & ADDITIONAL TESTS:    Imaging Personally Reviewed:  [ ] YES  [ ] NO    HEALTH ISSUES - PROBLEM Dx:
Family meeting with the patients , daughter, and daughter in law.  We have discussed the patients status with Dr. Rico.  The general opinion of the family is that patients life has not been what she wanted with her dementia and residence in a SNF.  She has orders firDNR/DNI,  And, if she does not respond to treatment in the next 2 days, their wish would be to speak with hospice  and consider comfort measures only
KATELYN BLANCO  77y  Female      Patient is a 77y old  Female who presents with a chief complaint of Respiratory distress x1 day (07 Apr 2018 07:35)      INTERVAL HPI/OVERNIGHT EVENTS: pt bipap dependent and confused. unreliable historian at this point      REVIEW OF SYSTEMS:  as above  All other review of systems negative    T(C): 37.3 (04-11-18 @ 13:24), Max: 39.3 (04-11-18 @ 00:00)  HR: 107 (04-11-18 @ 13:24) (94 - 107)  BP: 164/74 (04-11-18 @ 13:24) (164/74 - 186/94)  RR: 22 (04-11-18 @ 13:24) (18 - 22)  SpO2: --  Wt(kg): --Vital Signs Last 24 Hrs  T(C): 37.3 (11 Apr 2018 13:24), Max: 39.3 (11 Apr 2018 00:00)  T(F): 99.1 (11 Apr 2018 13:24), Max: 102.8 (11 Apr 2018 00:00)  HR: 107 (11 Apr 2018 13:24) (94 - 107)  BP: 164/74 (11 Apr 2018 13:24) (164/74 - 186/94)  BP(mean): --  RR: 22 (11 Apr 2018 13:24) (18 - 22)  SpO2: --      04-10-18 @ 07:01  -  04-11-18 @ 07:00  --------------------------------------------------------  IN: 0 mL / OUT: 0 mL / NET: 0 mL    04-11-18 @ 07:01  -  04-11-18 @ 17:03  --------------------------------------------------------  IN: 375 mL / OUT: 0 mL / NET: 375 mL        PHYSICAL EXAM:  GENERAL: tachypneac on bipap, opens eyes to verbal stimulus  PSYCH: no agitation, confused  NERVOUS SYSTEM: easily arousable, no new focal deficits  PULMONARY: Clear to percussion bilaterally; No rales, rhonchi, wheezing, or rubs but tachypneac on bipap, poor inspiratory effort/shallow  CARDIOVASCULAR: Regular rate and rhythm; No murmurs, rubs, or gallops  GI: Soft, Nontender, Nondistended; Bowel sounds present  EXTREMITIES:  2+ Peripheral Pulses, No clubbing, cyanosis, or edema    Consultant(s) Notes Reviewed:  [x ] YES  [ ] NO    Discussed with Consultants/Other Providers [ x] YES     LABS                          10.8   21.69 )-----------( 394      ( 11 Apr 2018 07:07 )             36.2     04-11    156<H>  |  113<H>  |  89<HH>  ----------------------------<  376<H>  4.2   |  30  |  1.4    Ca    8.6      11 Apr 2018 07:07  Mg     2.6     04-10            Lactate Trend  04-08 @ 06:58 Lactate:1.0         CAPILLARY BLOOD GLUCOSE  277 (09 Apr 2018 21:24)            RADIOLOGY & ADDITIONAL TESTS:    Imaging Personally Reviewed:  [ ] YES  [x ] NO    HEALTH ISSUES - PROBLEM Dx:  Heart failure: Heart failure  Dementia with behavioral disturbance, unspecified dementia type: Dementia with behavioral disturbance, unspecified dementia type  Pneumonia of left lower lobe due to infectious organism: Pneumonia of left lower lobe due to infectious organism  Palliative care encounter: Palliative care encounter  Goals of care, counseling/discussion: Goals of care, counseling/discussion
KATELYN BLANCO  77y, Female      OVERNIGHT EVENTS:    On bipap, non communicative. No diarrhea. No chappell catheter.    VITALS:  T(F): 102.4, Max: 102.4 (04-09-18 @ 03:50)  HR: 107  BP: 154/78  RR: 20Vital Signs Last 24 Hrs  T(C): 39.1 (09 Apr 2018 03:50), Max: 39.1 (09 Apr 2018 03:50)  T(F): 102.4 (09 Apr 2018 03:50), Max: 102.4 (09 Apr 2018 03:50)  HR: 107 (09 Apr 2018 03:50) (90 - 107)  BP: 154/78 (09 Apr 2018 03:50) (125/78 - 154/78)  BP(mean): 96 (08 Apr 2018 13:35) (96 - 96)  RR: 20 (09 Apr 2018 03:50) (18 - 20)  SpO2: 95% (09 Apr 2018 01:30) (95% - 95%)    TESTS & MEASUREMENTS:    04-08    146  |  103  |  73<HH>  ----------------------------<  206<H>  4.6   |  26  |  1.9<H>    Ca    8.8      08 Apr 2018 06:58  Mg     2.3     04-08    TPro  6.7  /  Alb  2.9<L>  /  TBili  0.5  /  DBili  x   /  AST  12  /  ALT  10  /  AlkPhos  117<H>  04-08    LIVER FUNCTIONS - ( 08 Apr 2018 06:58 )  Alb: 2.9 g/dL / Pro: 6.7 g/dL / ALK PHOS: 117 U/L / ALT: 10 U/L / AST: 12 U/L / GGT: x             Culture - Blood (collected 04-07-18 @ 11:58)  Source: .Blood None  Preliminary Report (04-08-18 @ 18:01):    No growth to date.    Culture - Urine (collected 04-07-18 @ 05:19)  Source: .Urine Catheterized  Final Report (04-08-18 @ 10:59):    No growth            RADIOLOGY & ADDITIONAL TESTS:    ANTIBIOTICS:  azithromycin  IVPB 500 milliGRAM(s) IV Intermittent every 24 hours  cefepime  IVPB 1000 milliGRAM(s) IV Intermittent every 12 hours  levoFLOXacin IVPB 750 milliGRAM(s) IV Intermittent every 48 hours
KATELYN BLANCO  77y, Female      OVERNIGHT EVENTS:    has fevers. On Bipap. Nonresponsive. No diarrhea. No chappell    VITALS:  T(F): 100.2, Max: 103.5 (04-10-18 @ 13:12)  HR: 94  BP: 186/94  RR: 18Vital Signs Last 24 Hrs  T(C): 37.9 (11 Apr 2018 07:30), Max: 39.7 (10 Apr 2018 13:12)  T(F): 100.2 (11 Apr 2018 07:30), Max: 103.5 (10 Apr 2018 13:12)  HR: 94 (11 Apr 2018 07:30) (94 - 99)  BP: 186/94 (11 Apr 2018 05:00) (174/90 - 186/94)  BP(mean): --  RR: 18 (11 Apr 2018 05:00) (18 - 18)  SpO2: --    TESTS & MEASUREMENTS:                        10.8   21.69 )-----------( 394      ( 11 Apr 2018 07:07 )             36.2     04-10    154<H>  |  106  |  86<HH>  ----------------------------<  270<H>  4.4   |  28  |  1.7<H>    Ca    9.0      10 Apr 2018 05:32  Mg     2.6     04-10          Culture - Blood (collected 04-07-18 @ 11:58)  Source: .Blood None  Preliminary Report (04-08-18 @ 18:01):    No growth to date.    Culture - Urine (collected 04-07-18 @ 05:19)  Source: .Urine Catheterized  Final Report (04-08-18 @ 10:59):    No growth            RADIOLOGY & ADDITIONAL TESTS:    ANTIBIOTICS:  azithromycin  IVPB 500 milliGRAM(s) IV Intermittent every 24 hours
KATELYN BLANCO  77y, Female      OVERNIGHT EVENTS:    none    VITALS:  T(F): 99.8, Max: 102.3 (04-10-18 @ 01:01)  HR: 109  BP: 179/96  RR: 20Vital Signs Last 24 Hrs  T(C): 37.7 (10 Apr 2018 03:35), Max: 39.1 (10 Apr 2018 01:01)  T(F): 99.8 (10 Apr 2018 03:35), Max: 102.3 (10 Apr 2018 01:01)  HR: 109 (10 Apr 2018 04:01) (96 - 126)  BP: 179/96 (10 Apr 2018 04:01) (139/76 - 179/96)  BP(mean): --  RR: 20 (09 Apr 2018 21:29) (20 - 28)  SpO2: 98% (09 Apr 2018 21:30) (98% - 98%)    TESTS & MEASUREMENTS:                        10.6   17.86 )-----------( 365      ( 10 Apr 2018 05:32 )             35.2     04-10    154<H>  |  106  |  86<HH>  ----------------------------<  270<H>  4.4   |  28  |  1.7<H>    Ca    9.0      10 Apr 2018 05:32  Mg     2.6     04-10          Culture - Blood (collected 04-07-18 @ 11:58)  Source: .Blood None  Preliminary Report (04-08-18 @ 18:01):    No growth to date.    Culture - Urine (collected 04-07-18 @ 05:19)  Source: .Urine Catheterized  Final Report (04-08-18 @ 10:59):    No growth            RADIOLOGY & ADDITIONAL TESTS:    ANTIBIOTICS:  azithromycin  IVPB 500 milliGRAM(s) IV Intermittent every 24 hours  oseltamivir 30 milliGRAM(s) Oral every 12 hours
SUBJECTIVE:    Patient is a 77y old  Female who presents with a chief complaint of Respiratory distress x1 day (07 Apr 2018 07:35)    Currently admitted to medicine with the primary diagnosis of Pneumonia of left lower lobe due to infectious organism     Today is hospital day 2d. This morning she is resting comfortably in bed and reports no new issues or overnight events.     PAST MEDICAL & SURGICAL HISTORY  PAST MEDICAL & SURGICAL HISTORY:  Gastroesophageal reflux disease, esophagitis presence not specified  Essential hypertension  Type 2 diabetes mellitus with complication, without long-term current use of insulin  Asthma, unspecified asthma severity, unspecified whether complicated, unspecified whether persistent  H/O knee surgery    SOCIAL HISTORY:    ALLERGIES:  No Known Allergies    MEDICATIONS:  STANDING MEDICATIONS  aspirin  chewable 81 milliGRAM(s) Oral daily  azithromycin  IVPB 500 milliGRAM(s) IV Intermittent every 24 hours  dextrose 5% + sodium chloride 0.45%. 1000 milliLiter(s) IV Continuous <Continuous>  docusate sodium 100 milliGRAM(s) Oral two times a day  donepezil 10 milliGRAM(s) Oral at bedtime  heparin  Injectable 5000 Unit(s) SubCutaneous every 8 hours  loratadine 10 milliGRAM(s) Oral daily  memantine 5 milliGRAM(s) Oral daily  metoprolol tartrate 25 milliGRAM(s) Oral two times a day  mirtazapine 30 milliGRAM(s) Oral at bedtime  neomycin/BACItracin/polymyxin Topical Ointment 1 Application(s) Topical two times a day  oseltamivir 30 milliGRAM(s) Oral every 12 hours  oxyCODONE    5 mG/acetaminophen 325 mG 1 Tablet(s) Oral at bedtime  pantoprazole    Tablet 40 milliGRAM(s) Oral before breakfast    PRN MEDICATIONS  acetaminophen   Tablet 650 milliGRAM(s) Oral every 6 hours PRN    VITALS:   T(F): 98.1  HR: 126  BP: 139/76  RR: 28  SpO2: 95%    HOME MEDS: </u  acetaminophen 650 mg oral tablet: 1 tab(s) orally 2 times a day  aspirin 81 mg oral tablet, chewable: 1 tab(s) orally once a day  docusate sodium 100 mg oral tablet: 1 tab(s) orally 2 times a day  DONEPEZIL    TAB 10MG ODT:   Enemeez Mini 283 mg rectal enema: 1 each rectal 3 to 4 times a week, As Needed  Fosamax 70 mg oral tablet: 1 tab(s) orally once a week on Monday  IPRATROPIUM/ SOL ALBUTER:   loratadine 10 mg oral tablet: 1 tab(s) orally once a day  MEMANTINE    TAB HCL 5MG:   METOPROL TAR TAB 25MG:   MIRTAZAPINE  TAB 30MG:   OMEPRAZOLE   CAP 20MG:   OXYCOD/APAP  TAB 5-325MG:   Triple Antibiotic topical ointment: Apply topically to affected area 2 times a day. L outer hand      LABS:                        10.6   20.13 )-----------( 322      ( 09 Apr 2018 06:45 )             35.7     04-09    152<H>  |  106  |  74<HH>  ----------------------------<  173<H>  4.4   |  30  |  1.5    Ca    8.9      09 Apr 2018 06:45  Mg     2.3     04-08    TPro  6.7  /  Alb  2.9<L>  /  TBili  0.5  /  DBili  x   /  AST  12  /  ALT  10  /  AlkPhos  117<H>  04-08        ABG - ( 08 Apr 2018 11:10 )  pH: 7.35  /  pCO2: 53    /  pO2: 53    / HCO3: 29    / Base Excess: 1.9   /  SaO2: 100       Culture - Blood (collected 07 Apr 2018 11:58)  Source: .Blood None  Preliminary Report (08 Apr 2018 18:01):    No growth to date.    Culture - Urine (collected 07 Apr 2018 05:19)  Source: .Urine Catheterized  Final Report (08 Apr 2018 10:59):    No growth      RADIOLOGY:  < from: [facility] Duplex Lower Ext Vein Scan, Bilat (04.07.18 @ 17:08) >  Impression:    No evidence of deep venous thrombosis or superficial thrombophlebitis in   bilateral lower extremities.    < end of copied text >    < from: Xray Chest 1 View AP/PA (04.07.18 @ 03:51) >  Impression:    Right basilar discoid atelectasis, adjacent to elevated right   hemidiaphragm.    < end of copied text >    PHYSICAL EXAM:  GEN: No acute distress  HEENT: WNL  LUNGS: b/l crackles  HEART: S1/S2 present. RRR.   ABD: Soft, non-tender, non-distended. Bowel sounds present  EXT: WNL  NEURO: AAOX3    ASSESSMENT/PLAN:  77Y F from [facility] with pmh of severe dementia, asthma (nursing home papers say COPD but pt has never smoked), DM2, HTN and GERD sent in for increased work of breathing for 1 day.    Acute hypoxic/ hypercapneic respiratory failure 2/2 ? COPD exacerbation from sepsis 2/2 possible PNA, acidosis by PH  - Pt developed acute SOB in PM on 4/9--> stat CXR unchanged but had desat to 94% with b/l crackles on exam--> IV lasix, NRB, d/c'd IV d5 1/2NS, nebs  -Lactic acid not elevated and pt maintaining blood pressure   -palliative care consult for goals of care (tried calling family on 4/9, unable to reach)  - ID: D/C cefepime and D/C levofloxacin, Viral bronchitis, R/O Flu, No evidence of PNA, Check inflenza antigen, Start Tamiflu 75 mg po q12h  Droplet precautions, Blood/Urine cultures are negative, D/C cefepime and Levo. Azithromycin 500mg po q24h. Urine legionella antigen,   - Pulm: BIPAP prn, IV fluid if NPO, Complete antibiotic course, PAN culture, Short course Prednisone for 5 days, Respiratory therapy: chest percussion , lower extremity doppler neg  - Palliative: f/u recs, will try to contact family to set up meeting      JOE with hypernatremia likely prerenal as patient looks dehydrated  - started on  IV fluids but d/c'ed due to acute SOB and pulmonary crackles  - ECHO done in 2015 showed normal EF   - renal bladder sono  - urine osmolality and urine Na  - 2d echo  - trend BMP    Dementia  -c/w namenda, remeron, donepazil    HTN  -c/w metoprolol    DM2  -carbohydrate consistent diet as pt not on any meds at nursing home   -fingersticks    GERD  -c/w protonix    DVT ppx  -hep suq q    OOB--> chair  Dispo: PT is full code from nursing home and wants to donate eyes to her children.     Overall prognosis poor.
SUBJECTIVE:    Patient is a 77y old  Female who presents with a chief complaint of Respiratory distress x1 day (07 Apr 2018 07:35)    Currently admitted to medicine with the primary diagnosis of Pneumonia of left lower lobe due to infectious organism     Today is hospital day 3d. Patient pending palliative family meeting at 1pm     PAST MEDICAL & SURGICAL HISTORY  PAST MEDICAL & SURGICAL HISTORY:  Gastroesophageal reflux disease, esophagitis presence not specified  Essential hypertension  Type 2 diabetes mellitus with complication, without long-term current use of insulin  Asthma, unspecified asthma severity, unspecified whether complicated, unspecified whether persistent  H/O knee surgery    SOCIAL HISTORY:    ALLERGIES:  No Known Allergies    MEDICATIONS:  STANDING MEDICATIONS  acetaminophen  Suppository 650 milliGRAM(s) Rectal every 6 hours  aspirin  chewable 81 milliGRAM(s) Oral daily  azithromycin  IVPB 500 milliGRAM(s) IV Intermittent every 24 hours  docusate sodium 100 milliGRAM(s) Oral two times a day  heparin  Injectable 5000 Unit(s) SubCutaneous every 8 hours  metoprolol tartrate 25 milliGRAM(s) Oral two times a day  mirtazapine 30 milliGRAM(s) Oral at bedtime  neomycin/BACItracin/polymyxin Topical Ointment 1 Application(s) Topical two times a day  oseltamivir 30 milliGRAM(s) Oral every 12 hours  oxyCODONE    5 mG/acetaminophen 325 mG 1 Tablet(s) Oral at bedtime  pantoprazole    Tablet 40 milliGRAM(s) Oral before breakfast    PRN MEDICATIONS  acetaminophen   Tablet 650 milliGRAM(s) Oral every 6 hours PRN    VITALS:   T(F): 99.8  HR: 109  BP: 179/96  RR: 20  SpO2: 98%    HOME MEDS: </u  acetaminophen 650 mg oral tablet: 1 tab(s) orally 2 times a day  aspirin 81 mg oral tablet, chewable: 1 tab(s) orally once a day  docusate sodium 100 mg oral tablet: 1 tab(s) orally 2 times a day  DONEPEZIL    TAB 10MG ODT:   Enemeez Mini 283 mg rectal enema: 1 each rectal 3 to 4 times a week, As Needed  Fosamax 70 mg oral tablet: 1 tab(s) orally once a week on Monday  IPRATROPIUM/ SOL ALBUTER:   loratadine 10 mg oral tablet: 1 tab(s) orally once a day  MEMANTINE    TAB HCL 5MG:   METOPROL TAR TAB 25MG:   MIRTAZAPINE  TAB 30MG:   OMEPRAZOLE   CAP 20MG:   OXYCOD/APAP  TAB 5-325MG:   Triple Antibiotic topical ointment: Apply topically to affected area 2 times a day. L outer hand      LABS:                        10.6   17.86 )-----------( 365      ( 10 Apr 2018 05:32 )             35.2     04-10    154<H>  |  106  |  86<HH>  ----------------------------<  270<H>  4.4   |  28  |  1.7<H>    Ca    9.0      10 Apr 2018 05:32  Mg     2.6     04-10      RADIOLOGY:  < from: Xray Chest 1 View-PORTABLE IMMEDIATE (04.09.18 @ 15:46) >  Impression:      Increasing right base atelectasis    < end of copied text >  < from: [facility] Duplex Lower Ext Vein Scan, Bilat (04.07.18 @ 17:08) >  Impression:    No evidence of deep venous thrombosis or superficial thrombophlebitis in   bilateral lower extremities.    < end of copied text >      PHYSICAL EXAM:  GEN: Dyspnea  HEENT: WNL  LUNGS: B/l crackles   HEART: S1/S2 present. sinus tachycardia  ABD: Soft, non-tender, non-distended. Bowel sounds present  EXT: no LE edema  NEURO: Nonverbal, not following commands        ASSESSMENT/PLAN:  77Y F from [facility] with pmh of severe dementia, asthma (nursing home papers say COPD but pt has never smoked), DM2, HTN and GERD sent in for increased work of breathing for 1 day.    Acute hypoxic/ hypercapneic respiratory failure 2/2 ? COPD exacerbation from sepsis 2/2 possible PNA more likely viral infection, acidosis by PH  - Pt developed acute SOB on 4/9 in PM, CXR unchanged but had b/l crackles and desat to 94%, placed on NRB, IV lasix, nebs, d/c IV d5 1/2 NS  - Lactic acid not elevated and pt maintaining blood pressure   - palliative care consult for goals of care- family meeting at 1pm on 4/10  - ID: D/C cefepime and D/C levofloxacin, Viral bronchitis, R/O Flu, No evidence of PNA, Check inflenza antigen, Start Tamiflu 75 mg po q12h (pt kept on 30mg PO Q12H given renal function)  Droplet precautions, Blood/Urine cultures are negative, Azithromycin 500mg po q24h. Urine legionella antigen,   - Pulm: BIPAP prn, IV fluid if NPO, Complete antibiotic course, PAN culture, Short course Prednisone for 5 days, Respiratory therapy: chest percussion , lower extremity doppler neg      JOE with hypernatremia likely prerenal as patient looks dehydrated  - s/p  IV fluids @50ml/hr given acute SOB  - ECHO done in 2015 showed normal EF   - renal bladder sono   - urine osmolality and urine Na  - 2d echo  - trend BMP    Dementia  -c/w namenda, remeron, donepazil    HTN  -c/w metoprolol    DM2  -carbohydrate consistent diet as pt not on any meds at nursing home   -fingersticks    GERD  -c/w protonix    DVT ppx  -hep suq q    Bedrest  Dispo: PT is DNR/DNI after discussion with family, pending family meeting with palliative at 1 pm 4/10, from nursing home and wants to donate eyes to her children.   Overall prognosis poor.
SUBJECTIVE:    Patient is a 77y old  Female who presents with a chief complaint of Respiratory distress x1 day (07 Apr 2018 07:35)    Currently admitted to medicine with the primary diagnosis of Pneumonia of left lower lobe due to infectious organism     Today is hospital day 4d. Patient has agonal breathing, nonresponsive     PAST MEDICAL & SURGICAL HISTORY  PAST MEDICAL & SURGICAL HISTORY:  Gastroesophageal reflux disease, esophagitis presence not specified  Essential hypertension  Type 2 diabetes mellitus with complication, without long-term current use of insulin  Asthma, unspecified asthma severity, unspecified whether complicated, unspecified whether persistent  H/O knee surgery    SOCIAL HISTORY:    ALLERGIES:  No Known Allergies    MEDICATIONS:  STANDING MEDICATIONS  acetaminophen  Suppository 650 milliGRAM(s) Rectal every 6 hours  aspirin  chewable 81 milliGRAM(s) Oral daily  azithromycin  IVPB 500 milliGRAM(s) IV Intermittent every 24 hours  dextrose 5% + sodium chloride 0.45% with potassium chloride 20 mEq/L 1000 milliLiter(s) IV Continuous <Continuous>  docusate sodium 100 milliGRAM(s) Oral two times a day  heparin  Injectable 5000 Unit(s) SubCutaneous every 8 hours  metoprolol tartrate 25 milliGRAM(s) Oral two times a day  mirtazapine 30 milliGRAM(s) Oral at bedtime  neomycin/BACItracin/polymyxin Topical Ointment 1 Application(s) Topical two times a day  oxyCODONE    5 mG/acetaminophen 325 mG 1 Tablet(s) Oral at bedtime  pantoprazole    Tablet 40 milliGRAM(s) Oral before breakfast  piperacillin/tazobactam IVPB. 3.375 Gram(s) IV Intermittent every 8 hours    PRN MEDICATIONS  acetaminophen   Tablet 650 milliGRAM(s) Oral every 6 hours PRN    VITALS:   T(F): 100.2  HR: 94  BP: 186/94  RR: 18  SpO2: --    HOME MEDS: </u  acetaminophen 650 mg oral tablet: 1 tab(s) orally 2 times a day  aspirin 81 mg oral tablet, chewable: 1 tab(s) orally once a day  docusate sodium 100 mg oral tablet: 1 tab(s) orally 2 times a day  DONEPEZIL    TAB 10MG ODT:   Enemeez Mini 283 mg rectal enema: 1 each rectal 3 to 4 times a week, As Needed  Fosamax 70 mg oral tablet: 1 tab(s) orally once a week on Monday  IPRATROPIUM/ SOL ALBUTER:   loratadine 10 mg oral tablet: 1 tab(s) orally once a day  MEMANTINE    TAB HCL 5MG:   METOPROL TAR TAB 25MG:   MIRTAZAPINE  TAB 30MG:   OMEPRAZOLE   CAP 20MG:   OXYCOD/APAP  TAB 5-325MG:   Triple Antibiotic topical ointment: Apply topically to affected area 2 times a day. L outer hand      LABS:                        10.8   21.69 )-----------( 394      ( 11 Apr 2018 07:07 )             36.2     04-11    156<H>  |  113<H>  |  89<HH>  ----------------------------<  376<H>  4.2   |  30  |  1.4    Ca    8.6      11 Apr 2018 07:07  Mg     2.6     04-10    RADIOLOGY:    PHYSICAL EXAM:  GEN: Agonal breathing  HEENT: WNL  LUNGS: Decreased BS on R side, mild crackles on L side   HEART: S1/S2 present. RRR.   ABD: Soft, non-tender, non-distended. Bowel sounds present  EXT: no LE edema  NEURO: AAOx0, nonverbal, responds to pain and name, does not follow commands    ASSESSMENT/PLAN:  77Y F from [facility] with pmh of severe dementia, asthma (nursing home papers say COPD but pt has never smoked), DM2, HTN and GERD sent in for increased work of breathing for 1 day.    Acute hypoxic/ hypercapneic respiratory failure 2/2 ? COPD exacerbation from sepsis 2/2 possible PNA more likely viral infection, acidosis by PH  - Pt developed acute SOB on 4/9 in PM, CXR unchanged but had b/l crackles and desat to 94%, placed on NRB, IV lasix, nebs, d/c IV d5 1/2 NS  - Lactic acid not elevated and pt maintaining blood pressure   - palliative care, Dr. Saucedo: DNR/DNI, if she does not respond to treatment in the next 2 days, family wishes would be to speak with hospice  and consider comfort measures only  - ID: D/C cefepime and D/C levofloxacin, Viral bronchitis, no evidence of PNA, influenza and RVP negative, Blood/Urine cultures are negative, Azithromycin 500mg IV q24h. Urine legionella antigen  - Pulm: BIPAP prn, IV fluid if NPO, Complete antibiotic course, PAN culture, Short course Prednisone for 5 days, Respiratory therapy: chest percussion , lower extremity doppler neg      JOE with hypernatremia likely prerenal as patient looks dehydrated  - IV fluids @75ml/hr given acute SOB  - ECHO done in 2015 showed normal EF   - renal bladder sono   - urine osmolality and urine Na  - 2d echo  - trend BMP    Dementia  -c/w namenda, remeron, donepazil    HTN  -c/w metoprolol    DM2  -carbohydrate consistent diet as pt not on any meds at nursing home   -fingersticks    GERD  -c/w protonix    DVT ppx  -hep suq q    Bedrest  Dispo: PT is DNR/DNI after discussion with family, from nursing home and wants to donate eyes to her children.   Overall prognosis poor.
SUBJECTIVE:    Responds to painful stimuli   Does not follow commands   Does not seem to tanisha n any resp distress     REVIEW OF SYSTEMS:  See HPI    PHYSICAL EXAM  Vital Signs Last 24 Hrs  T(C): 37.3 (2018 05:07), Max: 38 (2018 20:38)  T(F): 99.1 (2018 05:07), Max: 100.4 (2018 20:38)  HR: 92 (2018 05:07) (92 - 107)  BP: 118/67 (2018 05:07) (118/67 - 178/67)  BP(mean): --  RR: 20 (2018 05:07) (18 - 20)  SpO2: 99% (2018 12:05) (99% - 99%)    General: In NAD, AAO x 0, does not follow commands, responds to painful stimuli   HEENT: JOSE ALFREDO               Lymph Nodes: No Cervical LN    Lungs: Alan BS  Cardiovascular: Regular  Abdomen: Soft. + BS  Extremities: No clubbing   Skin: Warm  Neurological: Non focal      18 @ 07:01  -  18 @ 07:00  --------------------------------------------------------  IN:  Total IN: 0 mL    OUT:    Voided: 3 mL  Total OUT: 3 mL    Total NET: -3 mL          LABS:                          12.6   28.03 )-----------( 325      ( 2018 03:14 )             42.1                                                   146  |  103  |  73<HH>  ----------------------------<  206<H>  4.6   |  26  |  1.9<H>    Ca    8.8      2018 06:58  Mg     2.3     -    TPro  6.7  /  Alb  2.9<L>  /  TBili  0.5  /  DBili  x   /  AST  12  /  ALT  10  /  AlkPhos  117<H>  04-08                                             Urinalysis Basic - ( 2018 05:19 )    Color: Orange / Appearance: Cloudy / S.025 / pH: x  Gluc: x / Ketone: Negative  / Bili: Small / Urobili: 1.0 mg/dL   Blood: x / Protein: 30 mg/dL / Nitrite: Negative   Leuk Esterase: Small / RBC: 2-5 /HPF / WBC 6-10 /HPF   Sq Epi: x / Non Sq Epi: Few /HPF / Bacteria: Few /HPF        CARDIAC MARKERS ( 2018 03:14 )  x     / 0.01 ng/mL / 128 U/L / x     / x                                                LIVER FUNCTIONS - ( 2018 06:58 )  Alb: 2.9 g/dL / Pro: 6.7 g/dL / ALK PHOS: 117 U/L / ALT: 10 U/L / AST: 12 U/L / GGT: x                                                                                            ABG - ( 2018 07:57 )  pH: 7.31  /  pCO2: 52    /  pO2: 129   / HCO3: 26    / Base Excess: ?-0.2 /  SaO2: ?99.6     MEDICATIONS  (STANDING):  aspirin  chewable 81 milliGRAM(s) Oral daily  azithromycin  IVPB 500 milliGRAM(s) IV Intermittent every 24 hours  cefepime  IVPB 1000 milliGRAM(s) IV Intermittent every 12 hours  docusate sodium 100 milliGRAM(s) Oral two times a day  donepezil 10 milliGRAM(s) Oral at bedtime  heparin  Injectable 5000 Unit(s) SubCutaneous every 8 hours  levoFLOXacin IVPB 750 milliGRAM(s) IV Intermittent every 48 hours  loratadine 10 milliGRAM(s) Oral daily  memantine 5 milliGRAM(s) Oral daily  metoprolol tartrate 25 milliGRAM(s) Oral two times a day  mirtazapine 30 milliGRAM(s) Oral at bedtime  neomycin/BACItracin/polymyxin Topical Ointment 1 Application(s) Topical two times a day  oxyCODONE    5 mG/acetaminophen 325 mG 1 Tablet(s) Oral at bedtime  pantoprazole    Tablet 40 milliGRAM(s) Oral before breakfast  sodium chloride 0.45%. 1000 milliLiter(s) (50 mL/Hr) IV Continuous <Continuous>    MEDICATIONS  (PRN):  acetaminophen   Tablet 650 milliGRAM(s) Oral every 6 hours PRN For Temp greater than 38 C (100.4 F)      Radiology    CXR Rt elevated diaphragm
Spoke with patient's daughter earlier and family meeting at 1.30pm with , children, and daughter in law. we have discussed her medical status and lack of improvement, they understand and wish to consider comfort measures only. Therefore, we will remove BIPAP from the patient, discontinue all unnecessary medications, stop blood work and routine vitals, and place on nasal cannula.
We have not gien morphine because the patient is not having episodes of breathlessness.  However the patients family was made aware that if we are concerned for her comfort ,we will not ask for permission to treat. They understand.
KATELYN BLANCO  77y, Female      OVERNIGHT EVENTS:    on bipap, no change clinically. Daughter in place.    VITALS:  T(F): 101.9, Max: 102 (04-12-18 @ 01:54)  HR: 105  BP: 162/85  RR: 25Vital Signs Last 24 Hrs  T(C): 38.8 (12 Apr 2018 05:13), Max: 38.9 (12 Apr 2018 01:54)  T(F): 101.9 (12 Apr 2018 05:13), Max: 102 (12 Apr 2018 01:54)  HR: 105 (12 Apr 2018 05:13) (105 - 114)  BP: 162/85 (12 Apr 2018 05:13) (162/85 - 164/74)  BP(mean): --  RR: 25 (12 Apr 2018 05:13) (22 - 25)  SpO2: 93% (11 Apr 2018 21:26) (93% - 93%)    TESTS & MEASUREMENTS:                        11.5   26.61 )-----------( 416      ( 12 Apr 2018 06:31 )             40.4     04-12    160<H>  |  117<H>  |  79<HH>  ----------------------------<  258<H>  4.9   |  32  |  1.5    Ca    8.8      12 Apr 2018 06:31  Mg     2.5     04-12          Culture - Blood (collected 04-07-18 @ 11:58)  Source: .Blood None  Preliminary Report (04-08-18 @ 18:01):    No growth to date.    Culture - Urine (collected 04-07-18 @ 05:19)  Source: .Urine Catheterized  Final Report (04-08-18 @ 10:59):    No growth            RADIOLOGY & ADDITIONAL TESTS:    ANTIBIOTICS:  piperacillin/tazobactam IVPB. 3.375 Gram(s) IV Intermittent every 8 hours
KATELYN BLANCO  77y  Female      Patient is a 77y old  Female who presents with a chief complaint of Respiratory distress x1 day (07 Apr 2018 07:35)      INTERVAL HPI/OVERNIGHT EVENTS: tachypneac/ confused      REVIEW OF SYSTEMS:  unreliable historian at this moment  All other review of systems negative    T(C): 38.8 (04-12-18 @ 05:13), Max: 38.9 (04-12-18 @ 01:54)  HR: 105 (04-12-18 @ 05:13) (105 - 114)  BP: 162/85 (04-12-18 @ 05:13) (162/85 - 162/85)  RR: 25 (04-12-18 @ 05:13) (25 - 25)  SpO2: 93% (04-11-18 @ 21:26) (93% - 93%)  Wt(kg): --Vital Signs Last 24 Hrs  T(C): 38.8 (12 Apr 2018 05:13), Max: 38.9 (12 Apr 2018 01:54)  T(F): 101.9 (12 Apr 2018 05:13), Max: 102 (12 Apr 2018 01:54)  HR: 105 (12 Apr 2018 05:13) (105 - 114)  BP: 162/85 (12 Apr 2018 05:13) (162/85 - 162/85)  BP(mean): --  RR: 25 (12 Apr 2018 05:13) (25 - 25)  SpO2: 93% (11 Apr 2018 21:26) (93% - 93%)      04-11-18 @ 07:01  -  04-12-18 @ 07:00  --------------------------------------------------------  IN: 375 mL / OUT: 0 mL / NET: 375 mL        PHYSICAL EXAM:  GENERAL: tachypneac  PSYCH: no agitation, confused  NERVOUS SYSTEM:  easily arousable, no new focal changes  PULMONARY: tachypneac on bipap  CARDIOVASCULAR: Regular rate and rhythm; No murmurs, rubs, or gallops  GI: Soft, Nontender, Nondistended; Bowel sounds present  EXTREMITIES:  2+ Peripheral Pulses, No clubbing, cyanosis, or edema    Consultant(s) Notes Reviewed:  [x ] YES  [ ] NO    Discussed with Consultants/Other Providers [ x] YES     LABS                          11.5   26.61 )-----------( 416      ( 12 Apr 2018 06:31 )             40.4     04-12    160<H>  |  117<H>  |  79<HH>  ----------------------------<  258<H>  4.9   |  32  |  1.5    Ca    8.8      12 Apr 2018 06:31  Mg     2.5     04-12            Lactate Trend  04-08 @ 06:58 Lactate:1.0         CAPILLARY BLOOD GLUCOSE            RADIOLOGY & ADDITIONAL TESTS:    Imaging Personally Reviewed:  [ ] YES  [ x] NO    HEALTH ISSUES - PROBLEM Dx:  Heart failure: Heart failure  Dementia with behavioral disturbance, unspecified dementia type: Dementia with behavioral disturbance, unspecified dementia type  Pneumonia of left lower lobe due to infectious organism: Pneumonia of left lower lobe due to infectious organism  Palliative care encounter: Palliative care encounter  Goals of care, counseling/discussion: Goals of care, counseling/discussion

## 2018-04-12 NOTE — DIETITIAN INITIAL EVALUATION ADULT. - ENERGY NEEDS
(5640-4987 kcal/day= MSJ 1.2-1.3 AF); (65-78 g/day = 1.0-1.2 g/day); fluid needs: per LIP (pt currently on IVFluids)

## 2018-04-12 NOTE — PROGRESS NOTE ADULT - PROVIDER SPECIALTY LIST ADULT
Hospitalist
Infectious Disease
Internal Medicine
Palliative Care
Pulmonology
Internal Medicine
Internal Medicine
Infectious Disease
Hospitalist

## 2018-04-12 NOTE — PROGRESS NOTE ADULT - ASSESSMENT
77Y F from [facility] with pmh of severe dementia, asthma (nursing home papers say COPD but pt has never smoked), DM2, HTN and GERD sent in for increased work of breathing for 1 day.   admitted with metabolic encephalopathy, acute combined hypoxic/hypercarbic respiratory failure secondary to PNA and COPD exacerbation  clinically deteriorating    held family meeting with palliative care/family  goal of care, comfort oriented  DNR/DNI  opiates analgesia/air hunger  supplemental O2  withdraw NIPPV  counselling/support provided to family

## 2018-04-12 NOTE — DIETITIAN INITIAL EVALUATION ADULT. - PERTINENT MEDS FT
heparin, abx, IVF (dextrose + NaCl + potassium chloride), morphine, docusate, metoprolol, mirtazapine, oxycodone, pantoprazole

## 2018-04-12 NOTE — PROGRESS NOTE ADULT - ASSESSMENT
Plan   Morphine  4mg iv x1 start prior to removal of BIPAP.  Morphine drip at 1mg/hr.  Robinul .4mg iv x1 stat  and 0.2mg iv q6hrs PRN secretion.   Ativan 1mg q4hr PRN Agitation.  We will continue to monitor.   Comfort measure ONLY  DNI/DNR   we will f/u

## 2018-04-16 DIAGNOSIS — J69.0 PNEUMONITIS DUE TO INHALATION OF FOOD AND VOMIT: ICD-10-CM

## 2018-04-16 DIAGNOSIS — J44.1 CHRONIC OBSTRUCTIVE PULMONARY DISEASE WITH (ACUTE) EXACERBATION: ICD-10-CM

## 2018-04-16 DIAGNOSIS — F03.91 UNSPECIFIED DEMENTIA WITH BEHAVIORAL DISTURBANCE: ICD-10-CM

## 2018-04-16 DIAGNOSIS — J96.02 ACUTE RESPIRATORY FAILURE WITH HYPERCAPNIA: ICD-10-CM

## 2018-04-16 DIAGNOSIS — J96.01 ACUTE RESPIRATORY FAILURE WITH HYPOXIA: ICD-10-CM

## 2018-04-16 DIAGNOSIS — E11.9 TYPE 2 DIABETES MELLITUS WITHOUT COMPLICATIONS: ICD-10-CM

## 2018-04-16 DIAGNOSIS — K21.9 GASTRO-ESOPHAGEAL REFLUX DISEASE WITHOUT ESOPHAGITIS: ICD-10-CM

## 2018-04-16 DIAGNOSIS — Z99.81 DEPENDENCE ON SUPPLEMENTAL OXYGEN: ICD-10-CM

## 2018-04-16 DIAGNOSIS — G93.41 METABOLIC ENCEPHALOPATHY: ICD-10-CM

## 2018-04-16 DIAGNOSIS — E86.0 DEHYDRATION: ICD-10-CM

## 2018-04-16 DIAGNOSIS — L89.153 PRESSURE ULCER OF SACRAL REGION, STAGE 3: ICD-10-CM

## 2018-04-16 DIAGNOSIS — R06.02 SHORTNESS OF BREATH: ICD-10-CM

## 2018-04-16 DIAGNOSIS — E87.0 HYPEROSMOLALITY AND HYPERNATREMIA: ICD-10-CM

## 2018-04-16 DIAGNOSIS — Z51.89 ENCOUNTER FOR OTHER SPECIFIED AFTERCARE: ICD-10-CM

## 2018-04-16 DIAGNOSIS — N17.9 ACUTE KIDNEY FAILURE, UNSPECIFIED: ICD-10-CM

## 2018-04-16 DIAGNOSIS — Z51.5 ENCOUNTER FOR PALLIATIVE CARE: ICD-10-CM

## 2018-04-16 DIAGNOSIS — I10 ESSENTIAL (PRIMARY) HYPERTENSION: ICD-10-CM

## 2018-04-16 DIAGNOSIS — A41.9 SEPSIS, UNSPECIFIED ORGANISM: ICD-10-CM

## 2018-04-16 DIAGNOSIS — E87.2 ACIDOSIS: ICD-10-CM

## 2019-02-22 NOTE — PROGRESS NOTE ADULT - ATTENDING COMMENTS
Internal Medicine Patient seen and examined independently of resident. Case discussed with housestaff, nursing and patient, family

## 2024-05-30 NOTE — CONSULT NOTE ADULT - PROVIDER SPECIALTY LIST ADULT
Increase Lantus to 35 units beneath the skin twice daily.    [phone]: Lizeth, pharmacist  
Infectious Disease
Palliative Care
Pulmonology